# Patient Record
Sex: FEMALE | Race: WHITE | NOT HISPANIC OR LATINO | URBAN - METROPOLITAN AREA
[De-identification: names, ages, dates, MRNs, and addresses within clinical notes are randomized per-mention and may not be internally consistent; named-entity substitution may affect disease eponyms.]

---

## 2019-02-11 ENCOUNTER — INPATIENT (INPATIENT)
Facility: HOSPITAL | Age: 74
LOS: 4 days | Discharge: HOME HEALTH | DRG: 481 | End: 2019-02-16
Attending: ORTHOPAEDIC SURGERY | Admitting: ORTHOPAEDIC SURGERY
Payer: MEDICARE

## 2019-02-11 VITALS
HEART RATE: 72 BPM | DIASTOLIC BLOOD PRESSURE: 65 MMHG | OXYGEN SATURATION: 99 % | TEMPERATURE: 98 F | SYSTOLIC BLOOD PRESSURE: 133 MMHG | RESPIRATION RATE: 16 BRPM

## 2019-02-11 DIAGNOSIS — Z98.89 OTHER SPECIFIED POSTPROCEDURAL STATES: Chronic | ICD-10-CM

## 2019-02-11 LAB
ALBUMIN SERPL ELPH-MCNC: 4 G/DL — SIGNIFICANT CHANGE UP (ref 3.3–5)
ALP SERPL-CCNC: 75 U/L — SIGNIFICANT CHANGE UP (ref 40–120)
ALT FLD-CCNC: SIGNIFICANT CHANGE UP U/L (ref 10–45)
ANION GAP SERPL CALC-SCNC: 11 MMOL/L — SIGNIFICANT CHANGE UP (ref 5–17)
AST SERPL-CCNC: SIGNIFICANT CHANGE UP U/L (ref 10–40)
BILIRUB SERPL-MCNC: 0.7 MG/DL — SIGNIFICANT CHANGE UP (ref 0.2–1.2)
BUN SERPL-MCNC: 17 MG/DL — SIGNIFICANT CHANGE UP (ref 7–23)
CALCIUM SERPL-MCNC: 9.1 MG/DL — SIGNIFICANT CHANGE UP (ref 8.4–10.5)
CHLORIDE SERPL-SCNC: 108 MMOL/L — SIGNIFICANT CHANGE UP (ref 96–108)
CO2 SERPL-SCNC: 22 MMOL/L — SIGNIFICANT CHANGE UP (ref 22–31)
CREAT SERPL-MCNC: 1.09 MG/DL — SIGNIFICANT CHANGE UP (ref 0.5–1.3)
GLUCOSE SERPL-MCNC: 122 MG/DL — HIGH (ref 70–99)
HCT VFR BLD CALC: 36.2 % — SIGNIFICANT CHANGE UP (ref 34.5–45)
HGB BLD-MCNC: 11.5 G/DL — SIGNIFICANT CHANGE UP (ref 11.5–15.5)
MCHC RBC-ENTMCNC: 31.4 PG — SIGNIFICANT CHANGE UP (ref 27–34)
MCHC RBC-ENTMCNC: 31.8 GM/DL — LOW (ref 32–36)
MCV RBC AUTO: 98.9 FL — SIGNIFICANT CHANGE UP (ref 80–100)
NRBC # BLD: 0 /100 WBCS — SIGNIFICANT CHANGE UP (ref 0–0)
PLATELET # BLD AUTO: 143 K/UL — LOW (ref 150–400)
POTASSIUM SERPL-MCNC: SIGNIFICANT CHANGE UP MMOL/L (ref 3.5–5.3)
POTASSIUM SERPL-SCNC: SIGNIFICANT CHANGE UP MMOL/L (ref 3.5–5.3)
PROT SERPL-MCNC: 7.6 G/DL — SIGNIFICANT CHANGE UP (ref 6–8.3)
RBC # BLD: 3.66 M/UL — LOW (ref 3.8–5.2)
RBC # FLD: 12.3 % — SIGNIFICANT CHANGE UP (ref 10.3–14.5)
SODIUM SERPL-SCNC: 141 MMOL/L — SIGNIFICANT CHANGE UP (ref 135–145)
WBC # BLD: 10.79 K/UL — HIGH (ref 3.8–10.5)
WBC # FLD AUTO: 10.79 K/UL — HIGH (ref 3.8–10.5)

## 2019-02-11 PROCEDURE — 73552 X-RAY EXAM OF FEMUR 2/>: CPT | Mod: 26,RT

## 2019-02-11 PROCEDURE — 71045 X-RAY EXAM CHEST 1 VIEW: CPT | Mod: 26

## 2019-02-11 PROCEDURE — 73502 X-RAY EXAM HIP UNI 2-3 VIEWS: CPT | Mod: 26,RT

## 2019-02-11 PROCEDURE — 99285 EMERGENCY DEPT VISIT HI MDM: CPT

## 2019-02-11 RX ORDER — DONEPEZIL HYDROCHLORIDE 10 MG/1
10 TABLET, FILM COATED ORAL AT BEDTIME
Qty: 0 | Refills: 0 | Status: DISCONTINUED | OUTPATIENT
Start: 2019-02-11 | End: 2019-02-16

## 2019-02-11 RX ORDER — DOCUSATE SODIUM 100 MG
100 CAPSULE ORAL THREE TIMES A DAY
Qty: 0 | Refills: 0 | Status: DISCONTINUED | OUTPATIENT
Start: 2019-02-11 | End: 2019-02-13

## 2019-02-11 RX ORDER — ATORVASTATIN CALCIUM 80 MG/1
10 TABLET, FILM COATED ORAL AT BEDTIME
Qty: 0 | Refills: 0 | Status: DISCONTINUED | OUTPATIENT
Start: 2019-02-11 | End: 2019-02-16

## 2019-02-11 RX ORDER — QUETIAPINE FUMARATE 200 MG/1
25 TABLET, FILM COATED ORAL AT BEDTIME
Qty: 0 | Refills: 0 | Status: DISCONTINUED | OUTPATIENT
Start: 2019-02-11 | End: 2019-02-16

## 2019-02-11 RX ORDER — MEMANTINE HYDROCHLORIDE 10 MG/1
10 TABLET ORAL ONCE
Qty: 0 | Refills: 0 | Status: COMPLETED | OUTPATIENT
Start: 2019-02-11 | End: 2019-02-11

## 2019-02-11 RX ORDER — HALOPERIDOL DECANOATE 100 MG/ML
2 INJECTION INTRAMUSCULAR EVERY 8 HOURS
Qty: 0 | Refills: 0 | Status: DISCONTINUED | OUTPATIENT
Start: 2019-02-11 | End: 2019-02-16

## 2019-02-11 RX ORDER — MORPHINE SULFATE 50 MG/1
2 CAPSULE, EXTENDED RELEASE ORAL ONCE
Qty: 0 | Refills: 0 | Status: DISCONTINUED | OUTPATIENT
Start: 2019-02-11 | End: 2019-02-11

## 2019-02-11 RX ORDER — HALOPERIDOL DECANOATE 100 MG/ML
2.5 INJECTION INTRAMUSCULAR ONCE
Qty: 0 | Refills: 0 | Status: COMPLETED | OUTPATIENT
Start: 2019-02-11 | End: 2019-02-11

## 2019-02-11 RX ORDER — ATENOLOL 25 MG/1
25 TABLET ORAL DAILY
Qty: 0 | Refills: 0 | Status: DISCONTINUED | OUTPATIENT
Start: 2019-02-11 | End: 2019-02-16

## 2019-02-11 RX ORDER — LEVETIRACETAM 250 MG/1
500 TABLET, FILM COATED ORAL
Qty: 0 | Refills: 0 | Status: DISCONTINUED | OUTPATIENT
Start: 2019-02-11 | End: 2019-02-16

## 2019-02-11 RX ORDER — FOLIC ACID 0.8 MG
1 TABLET ORAL DAILY
Qty: 0 | Refills: 0 | Status: DISCONTINUED | OUTPATIENT
Start: 2019-02-11 | End: 2019-02-16

## 2019-02-11 RX ORDER — ACETAMINOPHEN 500 MG
650 TABLET ORAL EVERY 6 HOURS
Qty: 0 | Refills: 0 | Status: DISCONTINUED | OUTPATIENT
Start: 2019-02-11 | End: 2019-02-13

## 2019-02-11 RX ADMIN — MEMANTINE HYDROCHLORIDE 10 MILLIGRAM(S): 10 TABLET ORAL at 21:53

## 2019-02-11 RX ADMIN — MORPHINE SULFATE 2 MILLIGRAM(S): 50 CAPSULE, EXTENDED RELEASE ORAL at 16:30

## 2019-02-11 RX ADMIN — LEVETIRACETAM 500 MILLIGRAM(S): 250 TABLET, FILM COATED ORAL at 21:54

## 2019-02-11 RX ADMIN — ATORVASTATIN CALCIUM 10 MILLIGRAM(S): 80 TABLET, FILM COATED ORAL at 21:54

## 2019-02-11 RX ADMIN — QUETIAPINE FUMARATE 25 MILLIGRAM(S): 200 TABLET, FILM COATED ORAL at 21:53

## 2019-02-11 RX ADMIN — HALOPERIDOL DECANOATE 2.5 MILLIGRAM(S): 100 INJECTION INTRAMUSCULAR at 20:58

## 2019-02-11 RX ADMIN — DONEPEZIL HYDROCHLORIDE 10 MILLIGRAM(S): 10 TABLET, FILM COATED ORAL at 21:53

## 2019-02-11 RX ADMIN — MORPHINE SULFATE 2 MILLIGRAM(S): 50 CAPSULE, EXTENDED RELEASE ORAL at 20:42

## 2019-02-11 RX ADMIN — Medication 100 MILLIGRAM(S): at 21:54

## 2019-02-11 NOTE — ED ADULT NURSE NOTE - NS ED PATIENT SAFETY CONCERN
----- Message from Veronica Quintanilla MD sent at 11/26/2017 10:08 PM EST -----  Let him know  ----- Message -----  From: Marybeth Briones  Sent: 11/22/2017   8:58 AM  To: Veronica Quintanilla MD    PA denied for [employer].
No

## 2019-02-11 NOTE — ED PROVIDER NOTE - MEDICAL DECISION MAKING DETAILS
here w/ hip fracture. guardian updated. ortho consulted and at bedside. anticipate admission for operative repair as this would improve her quality of life with this injury.

## 2019-02-11 NOTE — ED ADULT NURSE NOTE - OBJECTIVE STATEMENT
Pt from home with dementia , has caregivers day and night as per aid found patient in bed screamig in pain , right hip visible deformity pt only yells in pain on movement pt is confused awaiting x-ray iv started and labs sent agency [facility] has no medical information on pt .

## 2019-02-11 NOTE — ED ADULT TRIAGE NOTE - OTHER COMPLAINTS
Hx of dementia, alert to person. per HHA "shes tender to the right", unknown if sustained fall. soft splint applied by EMS

## 2019-02-11 NOTE — H&P ADULT - NSHPPHYSICALEXAM_GEN_ALL_CORE
Vital Signs Last 24 Hrs  T(C): 36.7 (11 Feb 2019 15:32), Max: 36.9 (11 Feb 2019 13:50)  T(F): 98 (11 Feb 2019 15:32), Max: 98.4 (11 Feb 2019 13:50)  HR: 65 (11 Feb 2019 19:52) (65 - 72)  BP: 153/96 (11 Feb 2019 19:52) (133/65 - 153/96)  BP(mean): --  RR: 17 (11 Feb 2019 19:52) (16 - 17)  SpO2: 98% (11 Feb 2019 19:52) (96% - 99%)    General: Thin elderly female lying supine; Patient is disoriented to the encounter; A&Ox0; Home health aide at bedside states this is consistent with baseline  RLE: Extremity shortened and externally rotated; No skin breaks over the extremity; Significant TTP over the groin area; Extremity WWP, DP/PT pulses 2+cap refill <2 sec; Pt unable to respond appropriately to sensory exam; Moving toes spontaneously  LLE/RUE/LUE/Back: No visible signs of trauma on head to toe exam; Pt unable to consistently participate with exam but reports no tenderness; No appreciable deformities

## 2019-02-11 NOTE — H&P ADULT - ASSESSMENT
A/P: 73F w severe dementia presents w R IT fx following unknown trauma. No apparent other injuries.    - Admit to Orthopaedics  - Added on to OR tomorrow for R IM nail w Dr. Saldana  - NPO/IVF at midnight  - Pain control  - Hold anticoagulation for OR  - CBC/CMP/Coags/UA/T&S  - CXR   - EKG  - Medical Clearance by Dr. Herrera  - Discussed with attending-- Dr. Saldana    Ortho pager [phone]

## 2019-02-11 NOTE — ED ADULT NURSE NOTE - NSIMPLEMENTINTERV_GEN_ALL_ED
Implemented All Universal Safety Interventions:  [place] to call system. Call bell, personal items and telephone within reach. Instruct patient to call for assistance. Room bathroom lighting operational. Non-slip footwear when patient is off stretcher. Physically safe environment: no spills, clutter or unnecessary equipment. Stretcher in lowest position, wheels locked, appropriate side rails in place.

## 2019-02-11 NOTE — H&P ADULT - NSHPSOCIALHISTORY_GEN_ALL_CORE
Patient with severe alzheimer's dementia  Requires 24hr care with HHA  Pt has no living family members involved in her life-- Legal guardianship by outside agency (daytime phone: [phone]; evening/weekend phone: [phone])  Pt has healthcare proxy Eveline Yadav (unclear relationship; phones: [phone]; [phone])

## 2019-02-11 NOTE — ED ADULT NURSE NOTE - CAS EDN DISCHARGE ASSESSMENT
Awake/Patient baseline mental status Alert and oriented to person, place and time/Awake/Patient baseline mental status

## 2019-02-11 NOTE — ED PROVIDER NOTE - PHYSICAL EXAMINATION
CONSTITUTIONAL: Well-appearing; well-nourished; in no apparent distress.   HEAD: Normocephalic; atraumatic.   EYES:  conjunctiva and sclera clear  ENT: normal nose; no rhinorrhea; normal pharynx with no erythema or lesions.   NECK: Supple; non-tender;   CARDIOVASCULAR: Normal S1, S2; no murmurs, rubs, or gallops. Regular rate and rhythm.   RESPIRATORY: Breathing easily; breath sounds clear and equal bilaterally; no wheezes, rhonchi, or rales.  GI: Soft; non-distended; non-tender; no palpable organomegaly.   EXT: No cyanosis or edema; N/V intact. RLE shortened, tender over hip, unable to range. distal pulses intact, sensation intact distally, able to wiggle toes  SKIN: Normal for age and race; warm; dry; good turgor; no apparent lesions or rash.   NEURO: A & Ox1, awake and alert; face symmetric; grossly unremarkable.   PSYCHOLOGICAL: The patient’s mood and manner are appropriate.

## 2019-02-11 NOTE — ED ADULT NURSE REASSESSMENT NOTE - NS ED NURSE REASSESS COMMENT FT1
Called Ortho to request joshi for patient spoke to Dr. Goodwin stated not necessary, patient just incontinent of urine, so unable to straight cath at the moment, as per Dr. Goodwin patient for surgery tomorrow night, she can be bladder scanned for residual and straight cath on floor if necessary, he will communicate this to floor.

## 2019-02-11 NOTE — H&P ADULT - HISTORY OF PRESENT ILLNESS
HPI: Pt is a 73F w PMHx severe dementia (otherwise unknown PMHx) brought in by A after pt complained of R hip pain this AM. No known trauma history. No other history of injury able to be provided. Pt unable to answer questions.    PAST MEDICAL & SURGICAL HISTORY:  Intracranial bleed  Dementia, with behavioral disturbance  Essential hypertension  History of back surgery: 20 years ago  S/P craniotomy: Right      Medications:  Home Medications:  Aricept 10 mg oral tablet: 1 tab(s) orally once a day (at bedtime) (25 Mar 2016 14:10)  atenolol 25 mg oral tablet: 1 tab(s) orally once a day (25 Mar 2016 14:10)  Colace 100 mg oral capsule: 1 cap(s) orally 3 times a day (25 Mar 2016 14:10)  folic acid 1 mg oral tablet: 1 tab(s) orally once a day (25 Mar 2016 14:10)  haloperidol: 2.5 milligram(s) orally every 8 hours, As Needed for agitation (25 Mar 2016 14:10)  Keppra 500 mg oral tablet: 1 tab(s) orally 2 times a day (18 Mar 2016 13:29)  Lipitor 10 mg oral tablet: 1 tab(s) orally once a day (at bedtime) (18 Mar 2016 13:49)  Namenda 10 mg oral tablet: 1 tab(s) orally once a day (06 Jun 2016 13:42)  Tylenol 8 Hour Caplet 650 mg oral tablet, extended release: 1 tab(s) orally 3 times a day, As Needed (28 Oct 2015 21:49)      Allergies    No Known Allergies    Intolerances

## 2019-02-11 NOTE — ED ADULT TRIAGE NOTE - AS TEMP SITE
PT DAILY TREATMENT NOTE - [facility]     Patient Name: Diego Cruz  Date:2018  : 1948  [x]  Patient  Verified  Payor: Raheem Hutson / Plan: [facility] [employer] MEDICARE CHOICE PPO/PFFS / Product Type: Managed Care Medicare /    In time:4:30Out time:5:15  Total Treatment Time (min): 45  Total Timed Codes (min): 45  1:1 Treatment Time ( [address] only): 39  Visit #: 2 of 10    Treatment Area: Pain in left foot [M79.672]  Plantar fascial fibromatosis [M72.2]    SUBJECTIVE  Pain Level (0-10 scale): 0/10  Any medication changes, allergies to medications, adverse drug reactions, diagnosis change, or new procedure performed?: [x] No    [] Yes (see summary sheet for update)  Subjective functional status/changes:   [] No changes reported  I want to walk without pain. OBJECTIVE      35 min Therapeutic Exercise:  [x] See flow sheet :   Rationale: increase ROM, increase strength and improve coordination to improve the patients ability to return to exercise program at gym. 10 min PROM, DTM, joint mobs to increase function of L foot    Rationale: decrease pain, increase ROM, increase tissue extensibility, decrease edema , correct positional vertigo and decrease trigger points to return to working out at the gym. With   [] TE   [] TA   [] neuro   [] other: Patient Education: [x] Review HEP    [] Progressed/Changed HEP based on:   [] positioning   [] body mechanics   [] transfers   [] heat/ice application    [] other:      Other Objective/Functional Measures: Ptwith tightness noted of the L gastroc and stretching of the muscle and tendon eleviated some soreness according to patient post treatment communication. Pain Level (0-10 scale) post treatment: 0/10    ASSESSMENT/Changes in Function: Progressing toward goals and should benefit from continued sessions.     Patient will continue to benefit from skilled PT services to address ROM deficits, address strength deficits, analyze and address soft tissue oral restrictions and analyze and cue movement patterns to attain remaining goals. [x]  See Plan of Care  []  See progress note/recertification  []  See Discharge Summary         Progress towards goals / Updated goals:  Short Term Goals: To be accomplished in 1 weeks:                        1. Pt will be independent with HEP to facilitate decreased frequency of symptoms. Long Term Goals: To be accomplished in 4 weeks:                        1. Pt will improve FOTO score by 9 points to increase ease of ADL's.                         2. Pt will increase left dorsiflexion AROM to 12 degrees bilaterally to facilitate greater dorsiflexion during ambulation. 3. Pt will increase left dorsiflexion MMT to 5/5 to facilitate longer step length during ambulation.                          4. Pt will increase left SLS balance to 30 seconds to decrease risk of falls.        PLAN  [x]  Upgrade activities as tolerated     [x]  Continue plan of care  []  Update interventions per flow sheet       []  Discharge due to:_  []  Other:_      Joan Novak 5/30/2018  3:30 PM    Future Appointments  Date Time Provider Ann Marie Lovell   5/30/2018 4:30 PM [facility] PT [address] [facility]   6/12/2018 7:30 AM Holly Davis MD [facility] [address]

## 2019-02-11 NOTE — ED PROVIDER NOTE - OBJECTIVE STATEMENT
72yo F hx of HTN, subdural s/p craniotomy, alzheimer's dementia, under guardianship, here w/ complaint of R hip pain. Started complaining of this yesterday. Aide with her now was not the one who called, she is not sure if/when pt fell or any prior hx. Pt does not know 2/2 her own dementia. No signs of trauma elsewhere.

## 2019-02-11 NOTE — ED PROVIDER NOTE - PROGRESS NOTE DETAILS
pts guardian is [employer]. [phone] (work hours), [phone] off hours. [facility] p[facility] pcp is [facility], [phone], NP Hank Guzman, cell [facility], [phone], NP Hank Guzman, cell [phone]

## 2019-02-12 DIAGNOSIS — I10 ESSENTIAL (PRIMARY) HYPERTENSION: ICD-10-CM

## 2019-02-12 DIAGNOSIS — I62.9 NONTRAUMATIC INTRACRANIAL HEMORRHAGE, UNSPECIFIED: ICD-10-CM

## 2019-02-12 DIAGNOSIS — Z01.818 ENCOUNTER FOR OTHER PREPROCEDURAL EXAMINATION: ICD-10-CM

## 2019-02-12 DIAGNOSIS — G30.1 ALZHEIMER'S DISEASE WITH LATE ONSET: ICD-10-CM

## 2019-02-12 DIAGNOSIS — S72.141A DISPLACED INTERTROCHANTERIC FRACTURE OF RIGHT FEMUR, INITIAL ENCOUNTER FOR CLOSED FRACTURE: ICD-10-CM

## 2019-02-12 LAB
ANION GAP SERPL CALC-SCNC: 13 MMOL/L — SIGNIFICANT CHANGE UP (ref 5–17)
APPEARANCE UR: CLEAR — SIGNIFICANT CHANGE UP
BACTERIA # UR AUTO: ABNORMAL /HPF
BASOPHILS # BLD AUTO: 0.03 K/UL — SIGNIFICANT CHANGE UP (ref 0–0.2)
BASOPHILS NFR BLD AUTO: 0.3 % — SIGNIFICANT CHANGE UP (ref 0–2)
BILIRUB UR-MCNC: NEGATIVE — SIGNIFICANT CHANGE UP
BUN SERPL-MCNC: 17 MG/DL — SIGNIFICANT CHANGE UP (ref 7–23)
CALCIUM SERPL-MCNC: 9.2 MG/DL — SIGNIFICANT CHANGE UP (ref 8.4–10.5)
CHLORIDE SERPL-SCNC: 105 MMOL/L — SIGNIFICANT CHANGE UP (ref 96–108)
CO2 SERPL-SCNC: 24 MMOL/L — SIGNIFICANT CHANGE UP (ref 22–31)
COLOR SPEC: YELLOW — SIGNIFICANT CHANGE UP
CREAT SERPL-MCNC: 0.95 MG/DL — SIGNIFICANT CHANGE UP (ref 0.5–1.3)
DIFF PNL FLD: ABNORMAL
EOSINOPHIL # BLD AUTO: 0.03 K/UL — SIGNIFICANT CHANGE UP (ref 0–0.5)
EOSINOPHIL NFR BLD AUTO: 0.3 % — SIGNIFICANT CHANGE UP (ref 0–6)
EPI CELLS # UR: SIGNIFICANT CHANGE UP /HPF (ref 0–5)
GLUCOSE SERPL-MCNC: 100 MG/DL — HIGH (ref 70–99)
GLUCOSE UR QL: NEGATIVE — SIGNIFICANT CHANGE UP
HCT VFR BLD CALC: 34.6 % — SIGNIFICANT CHANGE UP (ref 34.5–45)
HGB BLD-MCNC: 11 G/DL — LOW (ref 11.5–15.5)
IMM GRANULOCYTES NFR BLD AUTO: 0.4 % — SIGNIFICANT CHANGE UP (ref 0–1.5)
KETONES UR-MCNC: NEGATIVE — SIGNIFICANT CHANGE UP
LEUKOCYTE ESTERASE UR-ACNC: ABNORMAL
LYMPHOCYTES # BLD AUTO: 2.12 K/UL — SIGNIFICANT CHANGE UP (ref 1–3.3)
LYMPHOCYTES # BLD AUTO: 20.3 % — SIGNIFICANT CHANGE UP (ref 13–44)
MCHC RBC-ENTMCNC: 31.2 PG — SIGNIFICANT CHANGE UP (ref 27–34)
MCHC RBC-ENTMCNC: 31.8 GM/DL — LOW (ref 32–36)
MCV RBC AUTO: 98 FL — SIGNIFICANT CHANGE UP (ref 80–100)
MONOCYTES # BLD AUTO: 1.17 K/UL — HIGH (ref 0–0.9)
MONOCYTES NFR BLD AUTO: 11.2 % — SIGNIFICANT CHANGE UP (ref 2–14)
MRSA PCR RESULT.: NEGATIVE — SIGNIFICANT CHANGE UP
NEUTROPHILS # BLD AUTO: 7.04 K/UL — SIGNIFICANT CHANGE UP (ref 1.8–7.4)
NEUTROPHILS NFR BLD AUTO: 67.5 % — SIGNIFICANT CHANGE UP (ref 43–77)
NITRITE UR-MCNC: POSITIVE
NRBC # BLD: 0 /100 WBCS — SIGNIFICANT CHANGE UP (ref 0–0)
PH UR: 5 — SIGNIFICANT CHANGE UP (ref 5–8)
PLATELET # BLD AUTO: 150 K/UL — SIGNIFICANT CHANGE UP (ref 150–400)
POTASSIUM SERPL-MCNC: 3.8 MMOL/L — SIGNIFICANT CHANGE UP (ref 3.5–5.3)
POTASSIUM SERPL-SCNC: 3.8 MMOL/L — SIGNIFICANT CHANGE UP (ref 3.5–5.3)
PROT UR-MCNC: ABNORMAL MG/DL
RBC # BLD: 3.53 M/UL — LOW (ref 3.8–5.2)
RBC # FLD: 12.5 % — SIGNIFICANT CHANGE UP (ref 10.3–14.5)
RBC CASTS # UR COMP ASSIST: < 5 /HPF — SIGNIFICANT CHANGE UP
S AUREUS DNA NOSE QL NAA+PROBE: NEGATIVE — SIGNIFICANT CHANGE UP
SODIUM SERPL-SCNC: 142 MMOL/L — SIGNIFICANT CHANGE UP (ref 135–145)
SP GR SPEC: >=1.03 — SIGNIFICANT CHANGE UP (ref 1–1.03)
UROBILINOGEN FLD QL: 0.2 E.U./DL — SIGNIFICANT CHANGE UP
WBC # BLD: 10.43 K/UL — SIGNIFICANT CHANGE UP (ref 3.8–10.5)
WBC # FLD AUTO: 10.43 K/UL — SIGNIFICANT CHANGE UP (ref 3.8–10.5)
WBC UR QL: ABNORMAL /HPF

## 2019-02-12 PROCEDURE — 73552 X-RAY EXAM OF FEMUR 2/>: CPT | Mod: 26,RT

## 2019-02-12 PROCEDURE — 99223 1ST HOSP IP/OBS HIGH 75: CPT | Mod: GC

## 2019-02-12 RX ORDER — KETOROLAC TROMETHAMINE 30 MG/ML
15 SYRINGE (ML) INJECTION ONCE
Qty: 0 | Refills: 0 | Status: DISCONTINUED | OUTPATIENT
Start: 2019-02-12 | End: 2019-02-13

## 2019-02-12 RX ORDER — SENNA PLUS 8.6 MG/1
2 TABLET ORAL AT BEDTIME
Qty: 0 | Refills: 0 | Status: DISCONTINUED | OUTPATIENT
Start: 2019-02-13 | End: 2019-02-16

## 2019-02-12 RX ORDER — POLYETHYLENE GLYCOL 3350 17 G/17G
17 POWDER, FOR SOLUTION ORAL DAILY
Qty: 0 | Refills: 0 | Status: DISCONTINUED | OUTPATIENT
Start: 2019-02-13 | End: 2019-02-16

## 2019-02-12 RX ORDER — SODIUM CHLORIDE 9 MG/ML
1000 INJECTION INTRAMUSCULAR; INTRAVENOUS; SUBCUTANEOUS
Qty: 0 | Refills: 0 | Status: DISCONTINUED | OUTPATIENT
Start: 2019-02-12 | End: 2019-02-12

## 2019-02-12 RX ORDER — ACETAMINOPHEN 500 MG
650 TABLET ORAL EVERY 6 HOURS
Qty: 0 | Refills: 0 | Status: DISCONTINUED | OUTPATIENT
Start: 2019-02-13 | End: 2019-02-16

## 2019-02-12 RX ORDER — ONDANSETRON 8 MG/1
4 TABLET, FILM COATED ORAL EVERY 6 HOURS
Qty: 0 | Refills: 0 | Status: DISCONTINUED | OUTPATIENT
Start: 2019-02-12 | End: 2019-02-16

## 2019-02-12 RX ORDER — DOCUSATE SODIUM 100 MG
100 CAPSULE ORAL THREE TIMES A DAY
Qty: 0 | Refills: 0 | Status: DISCONTINUED | OUTPATIENT
Start: 2019-02-13 | End: 2019-02-16

## 2019-02-12 RX ORDER — TRAMADOL HYDROCHLORIDE 50 MG/1
50 TABLET ORAL EVERY 8 HOURS
Qty: 0 | Refills: 0 | Status: DISCONTINUED | OUTPATIENT
Start: 2019-02-12 | End: 2019-02-16

## 2019-02-12 RX ORDER — SODIUM CHLORIDE 9 MG/ML
1000 INJECTION INTRAMUSCULAR; INTRAVENOUS; SUBCUTANEOUS
Qty: 0 | Refills: 0 | Status: DISCONTINUED | OUTPATIENT
Start: 2019-02-12 | End: 2019-02-16

## 2019-02-12 RX ORDER — ACETAMINOPHEN 500 MG
1000 TABLET ORAL ONCE
Qty: 0 | Refills: 0 | Status: COMPLETED | OUTPATIENT
Start: 2019-02-12 | End: 2019-02-12

## 2019-02-12 RX ORDER — HEPARIN SODIUM 5000 [USP'U]/ML
5000 INJECTION INTRAVENOUS; SUBCUTANEOUS EVERY 8 HOURS
Qty: 0 | Refills: 0 | Status: DISCONTINUED | OUTPATIENT
Start: 2019-02-12 | End: 2019-02-12

## 2019-02-12 RX ORDER — ASPIRIN/CALCIUM CARB/MAGNESIUM 324 MG
325 TABLET ORAL
Qty: 0 | Refills: 0 | Status: DISCONTINUED | OUTPATIENT
Start: 2019-02-12 | End: 2019-02-16

## 2019-02-12 RX ORDER — CIPROFLOXACIN LACTATE 400MG/40ML
250 VIAL (ML) INTRAVENOUS
Qty: 0 | Refills: 0 | Status: DISCONTINUED | OUTPATIENT
Start: 2019-02-12 | End: 2019-02-15

## 2019-02-12 RX ORDER — ASPIRIN/CALCIUM CARB/MAGNESIUM 324 MG
325 TABLET ORAL
Qty: 0 | Refills: 0 | Status: DISCONTINUED | OUTPATIENT
Start: 2019-02-12 | End: 2019-02-12

## 2019-02-12 RX ORDER — TRAMADOL HYDROCHLORIDE 50 MG/1
25 TABLET ORAL EVERY 6 HOURS
Qty: 0 | Refills: 0 | Status: DISCONTINUED | OUTPATIENT
Start: 2019-02-12 | End: 2019-02-16

## 2019-02-12 RX ADMIN — Medication 100 MILLIGRAM(S): at 23:06

## 2019-02-12 RX ADMIN — ATENOLOL 25 MILLIGRAM(S): 25 TABLET ORAL at 06:33

## 2019-02-12 RX ADMIN — SODIUM CHLORIDE 75 MILLILITER(S): 9 INJECTION INTRAMUSCULAR; INTRAVENOUS; SUBCUTANEOUS at 03:14

## 2019-02-12 RX ADMIN — SODIUM CHLORIDE 75 MILLILITER(S): 9 INJECTION INTRAMUSCULAR; INTRAVENOUS; SUBCUTANEOUS at 20:53

## 2019-02-12 RX ADMIN — ATORVASTATIN CALCIUM 10 MILLIGRAM(S): 80 TABLET, FILM COATED ORAL at 23:06

## 2019-02-12 RX ADMIN — LEVETIRACETAM 500 MILLIGRAM(S): 250 TABLET, FILM COATED ORAL at 06:33

## 2019-02-12 RX ADMIN — DONEPEZIL HYDROCHLORIDE 10 MILLIGRAM(S): 10 TABLET, FILM COATED ORAL at 23:06

## 2019-02-12 RX ADMIN — Medication 1000 MILLIGRAM(S): at 23:55

## 2019-02-12 RX ADMIN — QUETIAPINE FUMARATE 25 MILLIGRAM(S): 200 TABLET, FILM COATED ORAL at 23:06

## 2019-02-12 RX ADMIN — Medication 100 MILLIGRAM(S): at 06:33

## 2019-02-12 RX ADMIN — Medication 400 MILLIGRAM(S): at 23:07

## 2019-02-12 NOTE — PROVIDER CONTACT NOTE (OTHER) - RECOMMENDATIONS
Suggested to have "constant observation" order discontinued. Will place px on bed alarm. Observe behavior upon arrival and institute enhanced precaution and obtain order for such if needed later

## 2019-02-12 NOTE — PROGRESS NOTE ADULT - SUBJECTIVE AND OBJECTIVE BOX
Ortho Note    Pt disoriented and unable to coherently participate in exam or interview.      Vital Signs Last 24 Hrs  T(C): 35.6 (02-12-19 @ 09:57), Max: 35.6 (02-12-19 @ 09:57)  T(F): 96 (02-12-19 @ 09:57), Max: 96 (02-12-19 @ 09:57)  HR: 67 (02-12-19 @ 09:57) (67 - 67)  BP: 126/59 (02-12-19 @ 09:57) (126/59 - 126/59)  BP(mean): --  RR: 18 (02-12-19 @ 09:57) (18 - 18)  SpO2: 99% (02-12-19 @ 09:57) (99% - 99%)      General: Pt A&Ox0, NAD  RLE shortened externally rotated   no tenting or breaks in skin of RLE  DP's palpable b/l  brisk cap refill b/l  pt moving toes b/l spontaneously                          11.0   10.43 )-----------( 150      ( 12 Feb 2019 06:57 )             34.6   12 Feb 2019 06:57    142    |  105    |  17     ----------------------------<  100    3.8     |  24     |  0.95       TPro  7.6    /  Alb  4.0    /  TBili  0.7    /  DBili  x      /  AST  See Note  /  ALT  See Note  /  AlkPhos  75     11 Feb 2019 14:42    UA:  +Nitrites, Leuks, and WBC's    A/P: 73yFemale s/p R intertrochanteric fracture   - Stable  - Pain Control as needed  - NPO/IVF  - Hold anticoagulation for OR   - WBS: NWB RLE  - Javier Following, medically optimized and cleared pt for OR  - Consult Palliative care  - Start empiric UTI treatment with ciprofloxacin  - Dispo: OR today     Ortho Pager [phone]

## 2019-02-12 NOTE — CONSULT NOTE ADULT - PROBLEM SELECTOR RECOMMENDATION 5
The patient's medical condition is optimized for surgery.  There is no contraindication for surgery.  There is no clinical evidence neither of angina, decompensated CHF, arrhthymias, nor valvular disease.   There is no limitation of exercise capacity.  MET is 5 .  ASA class is3 .  [place] cardiac risk factor is high .  DVT prophylaxis is indicated.  Pain control.  Early mobilization.  Avoid fluid overload.

## 2019-02-12 NOTE — CONSULT NOTE ADULT - PROBLEM SELECTOR RECOMMENDATION 4
high risk for delirium.  Avoid benzo and narcotics, Also mental status might deteriorate from fat emboli.  Risk for fall and aspiration.  Needs dysphagia evaluation

## 2019-02-12 NOTE — PROVIDER CONTACT NOTE (OTHER) - SITUATION
Telephone report received re px. Reportedly with order for constant observation in the ER holding area but without any attempt to get OOB nor any agitation.

## 2019-02-12 NOTE — PROGRESS NOTE ADULT - ASSESSMENT
A/P: 73F w severe dementia presents w R IT fx following unknown trauma. No apparent other injuries.    - Added on to OR for R IM nail w Dr. Saldana pending consent and medical clearance  - NPO/IVF  - Pain control  - Hold anticoagulation for OR  - Medical Clearance by Dr. Herrera  - DVT ppx: SCDs  - PT, WB: CHRIS RLE  - Dispo pending OR today vs tomorrow    Ortho pager [phone]

## 2019-02-12 NOTE — BRIEF OPERATIVE NOTE - PROCEDURE
<<-----Click on this checkbox to enter Procedure Antegrade intramedullary nailing for fracture of femur  02/12/2019    Active  [id number]

## 2019-02-12 NOTE — CONSULT NOTE ADULT - SUBJECTIVE AND OBJECTIVE BOX
Patient is a 73y old  Female who presents with a chief complaint of R Intertrochanteric fracture (12 Feb 2019 05:55)      HPI:  HPI: Pt is a 73F w PMHx severe dementia (otherwise unknown PMHx) brought in by [facility] after pt complained of R hip pain this AM. No known trauma history. No other history of injury able to be provided. Pt unable to answer questions.    PAST MEDICAL & SURGICAL HISTORY:  Intracranial bleed  Dementia, with behavioral disturbance  Essential hypertension  History of back surgery: 20 years ago  S/P craniotomy: Right      Medications:  Home Medications:  Aricept 10 mg oral tablet: 1 tab(s) orally once a day (at bedtime) (25 Mar 2016 14:10)  atenolol 25 mg oral tablet: 1 tab(s) orally once a day (25 Mar 2016 14:10)  Colace 100 mg oral capsule: 1 cap(s) orally 3 times a day (25 Mar 2016 14:10)  folic acid 1 mg oral tablet: 1 tab(s) orally once a day (25 Mar 2016 14:10)  haloperidol: 2.5 milligram(s) orally every 8 hours, As Needed for agitation (25 Mar 2016 14:10)  Keppra 500 mg oral tablet: 1 tab(s) orally 2 times a day (18 Mar 2016 13:29)  Lipitor 10 mg oral tablet: 1 tab(s) orally once a day (at bedtime) (18 Mar 2016 13:49)  Namenda 10 mg oral tablet: 1 tab(s) orally once a day (06 Jun 2016 13:42)  Tylenol 8 Hour Caplet 650 mg oral tablet, extended release: 1 tab(s) orally 3 times a day, As Needed (28 Oct 2015 21:49)      Allergies    No Known Allergies    Intolerances (11 Feb 2019 19:56)      PAST MEDICAL & SURGICAL HISTORY:  Intracranial bleed  Dementia, with behavioral disturbance  Essential hypertension  History of back surgery: 20 years ago  S/P craniotomy: Right      FAMILY HISTORY:  No pertinent family history in first degree relatives      SOCIAL HISTORY:  Smoking Status: [ ] Current, [ ] Former, x[ ] Never  Pack Years:    MEDICATIONS:  Pulmonary:    Antimicrobials:    Anticoagulants:    Onc:    GI/:  docusate sodium 100 milliGRAM(s) Oral three times a day    Endocrine:  atorvastatin 10 milliGRAM(s) Oral at bedtime    Cardiac:  ATENolol  Tablet 25 milliGRAM(s) Oral daily    Other Medications:  acetaminophen   Tablet .. 650 milliGRAM(s) Oral every 6 hours PRN  donepezil 10 milliGRAM(s) Oral at bedtime  folic acid 1 milliGRAM(s) Oral daily  haloperidol     Tablet 2 milliGRAM(s) Oral every 8 hours PRN  levETIRAcetam 500 milliGRAM(s) Oral two times a day  QUEtiapine 25 milliGRAM(s) Oral at bedtime  sodium chloride 0.9%. 1000 milliLiter(s) IV Continuous <Continuous>      Allergies    No Known Allergies    Intolerances        Vital Signs Last 24 Hrs  T(C): 37 (12 Feb 2019 04:41), Max: 37 (12 Feb 2019 04:41)  T(F): 98.6 (12 Feb 2019 04:41), Max: 98.6 (12 Feb 2019 04:41)  HR: 89 (12 Feb 2019 04:41) (65 - 89)  BP: 130/63 (12 Feb 2019 04:41) (130/63 - 153/96)  BP(mean): --  RR: 17 (12 Feb 2019 04:41) (16 - 18)  SpO2: 95% (12 Feb 2019 04:41) (95% - 99%)    02-11 @ 07:01  -  02-12 @ 07:00  --------------------------------------------------------  IN: 300 mL / OUT: 0 mL / NET: 300 mL          LABS:      CBC Full  -  ( 12 Feb 2019 06:57 )  WBC Count : 10.43 K/uL  Hemoglobin : 11.0 g/dL  Hematocrit : 34.6 %  Platelet Count - Automated : 150 K/uL  Mean Cell Volume : 98.0 fl  Mean Cell Hemoglobin : 31.2 pg  Mean Cell Hemoglobin Concentration : 31.8 gm/dL  Auto Neutrophil # : 7.04 K/uL  Auto Lymphocyte # : 2.12 K/uL  Auto Monocyte # : 1.17 K/uL  Auto Eosinophil # : 0.03 K/uL  Auto Basophil # : 0.03 K/uL  Auto Neutrophil % : 67.5 %  Auto Lymphocyte % : 20.3 %  Auto Monocyte % : 11.2 %  Auto Eosinophil % : 0.3 %  Auto Basophil % : 0.3 %    02-12    142  |  105  |  17  ----------------------------<  100<H>  3.8   |  24  |  0.95    Ca    9.2      12 Feb 2019 06:57    TPro  7.6  /  Alb  4.0  /  TBili  0.7  /  DBili  x   /  AST  See Note  /  ALT  See Note  /  AlkPhos  75  02-11    PT/INR - ( 11 Feb 2019 14:43 )   PT: 13.2 sec;   INR: 1.16          PTT - ( 11 Feb 2019 14:43 )  PTT:29.6 sec    CXR NAP  EKG RSR normal          < from: CT Head No Cont (12.25.16 @ 15:23) >    EXAM:  CT BRAIN                           PROCEDURE DATE:  12/25/2016                 INTERPRETATION:  Axial images were obtained from the skull base to the   cranial vertex without intravenous contrast. Soft tissue and bone   algorithm images wereevaluated.    Clinical information: Dementia. Found wandering.    The current study is compared with prior CT brain dated 6/3/2016.    There is no interval change. There is no evidence of acute intracranial   hemorrhage or obvious site of acute infarction. There is nonspecific   ventriculomegaly with prominence of the sulci and cisterns felt to most   likely reflect cerebral volume loss. There are patchy and confluent areas   of hypodensity in the cerebral white matter, nonspecific but likely   reflecting chronic small vessel ischemic change in a patient of this age.   There is no midline shift or mass effect. There are again evident   postoperative changes related to prior right craniotomy; there is   otherwise a normal appearance to the skull and skull base. The visualized   portions of the orbital contents are unremarkable. There is minor mucosal   thickening within bilateral anterior ethmoidal air cells with the   remaining visualized portions of the paranasal sinuses and mastoids noted   to be clear.    IMPRESSION:    Stable examination without evidence for acute intracranial hemorrhage or   obvious site of cerebral infarction.    < end of copied text >        RADIOLOGY & ADDITIONAL STUDIES (The following images were personally reviewed):

## 2019-02-12 NOTE — CONSULT NOTE ADULT - ATTENDING COMMENTS
Patient seen and examined with house-staff during bedside rounds.  Resident note read, including vitals, physical findings, laboratory data, and radiological reports.   Revisions included below.  Direct personal management at bed side and extensive interpretation of the data.  Plan was outlined and discussed in details with the housestaff.  Decision making of high complexity  Action taken for acute disease activity to reflect the level of care provided:  - medication reconciliation  - review laboratory data  palliative care coonsult  will call family

## 2019-02-12 NOTE — PROGRESS NOTE ADULT - SUBJECTIVE AND OBJECTIVE BOX
Ortho Note    Patient disoriented to the interview  Did not coherently answer questions or participate in exam     Vital Signs Last 24 Hrs  T(C): 37 (12 Feb 2019 04:41), Max: 37 (12 Feb 2019 04:41)  T(F): 98.6 (12 Feb 2019 04:41), Max: 98.6 (12 Feb 2019 04:41)  HR: 89 (12 Feb 2019 04:41) (65 - 89)  BP: 130/63 (12 Feb 2019 04:41) (130/63 - 153/96)  BP(mean): --  RR: 17 (12 Feb 2019 04:41) (16 - 18)  SpO2: 95% (12 Feb 2019 04:41) (95% - 99%)    VSS  General: Thin elderly female lying supine; A&Ox0, consistent w baseline  RLE: Extremity shortened and externally rotated; No skin breaks over the extremity; Significant TTP over the groin area; Extremity WWP, DP/PT pulses 2+cap refill <2 sec; Pt unable to respond appropriately to sensory exam; Moving toes spontaneously                          11.5   10.79 )-----------( 143      ( 11 Feb 2019 14:42 )             36.2   11 Feb 2019 14:42    141    |  108    |  17     ----------------------------<  122    See Note   |  22     |  1.09       TPro  7.6    /  Alb  4.0    /  TBili  0.7    /  DBili  x      /  AST  See Note  /  ALT  See Note  /  AlkPhos  75     11 Feb 2019 14:42

## 2019-02-12 NOTE — PROGRESS NOTE ADULT - SUBJECTIVE AND OBJECTIVE BOX
Ortho Post Op Check    Procedure: R IMN  Surgeon: Les     Pt comfortable without complaints, pain controlled  Denies CP, SOB, N/V, numbness/tingling     Vital Signs Last 24 Hrs  T(C): 37.8 (02-12-19 @ 22:52), Max: 37.8 (02-12-19 @ 22:52)  T(F): 100.1 (02-12-19 @ 22:52), Max: 100.1 (02-12-19 @ 22:52)  HR: 95 (02-12-19 @ 22:52) (66 - 95)  BP: 143/67 (02-12-19 @ 22:52) (96/51 - 143/67)  BP(mean): 88 (02-12-19 @ 22:15) (67 - 92)  RR: 19 (02-12-19 @ 22:52) (11 - 20)  SpO2: 95% (02-12-19 @ 22:15) (92% - 96%)      Demented at baseline. Denies pain  DSG C/D/I, aquacel   Pulses: 2+ PT  Sensation:  SILT s/s/sp/dp/t  Motor: EHL/FHL/TA/GS                           11.0   10.43 )-----------( 150      ( 12 Feb 2019 06:57 )             34.6   12 Feb 2019 06:57    142    |  105    |  17     ----------------------------<  100    3.8     |  24     |  0.95       TPro  7.6    /  Alb  4.0    /  TBili  0.7    /  DBili  x      /  AST  See Note  /  ALT  See Note  /  AlkPhos  75     11 Feb 2019 14:42      Post-op X-Ray: hardware intact     A/P: 73yFemale s/p R IMN   - Stable  - Pain Control  - DVT ppx:  BID / SCDs  - Post op abx: Ancef , Cipro (UTI)   - PT, WBS:  WBAT     Ortho Pager [phone]

## 2019-02-13 DIAGNOSIS — D50.0 IRON DEFICIENCY ANEMIA SECONDARY TO BLOOD LOSS (CHRONIC): ICD-10-CM

## 2019-02-13 DIAGNOSIS — Z98.890 OTHER SPECIFIED POSTPROCEDURAL STATES: ICD-10-CM

## 2019-02-13 DIAGNOSIS — N30.00 ACUTE CYSTITIS WITHOUT HEMATURIA: ICD-10-CM

## 2019-02-13 PROCEDURE — 99232 SBSQ HOSP IP/OBS MODERATE 35: CPT | Mod: GC

## 2019-02-13 RX ORDER — CEFAZOLIN SODIUM 1 G
2000 VIAL (EA) INJECTION EVERY 8 HOURS
Qty: 0 | Refills: 0 | Status: COMPLETED | OUTPATIENT
Start: 2019-02-13 | End: 2019-02-13

## 2019-02-13 RX ORDER — CEFAZOLIN SODIUM 1 G
2000 VIAL (EA) INJECTION EVERY 8 HOURS
Qty: 0 | Refills: 0 | Status: DISCONTINUED | OUTPATIENT
Start: 2019-02-13 | End: 2019-02-13

## 2019-02-13 RX ADMIN — ATENOLOL 25 MILLIGRAM(S): 25 TABLET ORAL at 05:37

## 2019-02-13 RX ADMIN — DONEPEZIL HYDROCHLORIDE 10 MILLIGRAM(S): 10 TABLET, FILM COATED ORAL at 22:20

## 2019-02-13 RX ADMIN — LEVETIRACETAM 500 MILLIGRAM(S): 250 TABLET, FILM COATED ORAL at 18:15

## 2019-02-13 RX ADMIN — ATORVASTATIN CALCIUM 10 MILLIGRAM(S): 80 TABLET, FILM COATED ORAL at 22:19

## 2019-02-13 RX ADMIN — Medication 15 MILLIGRAM(S): at 06:22

## 2019-02-13 RX ADMIN — Medication 250 MILLIGRAM(S): at 05:37

## 2019-02-13 RX ADMIN — Medication 325 MILLIGRAM(S): at 05:37

## 2019-02-13 RX ADMIN — HALOPERIDOL DECANOATE 2 MILLIGRAM(S): 100 INJECTION INTRAMUSCULAR at 00:31

## 2019-02-13 RX ADMIN — Medication 250 MILLIGRAM(S): at 18:16

## 2019-02-13 RX ADMIN — Medication 1 MILLIGRAM(S): at 18:15

## 2019-02-13 RX ADMIN — Medication 325 MILLIGRAM(S): at 18:16

## 2019-02-13 RX ADMIN — Medication 15 MILLIGRAM(S): at 05:37

## 2019-02-13 RX ADMIN — QUETIAPINE FUMARATE 25 MILLIGRAM(S): 200 TABLET, FILM COATED ORAL at 22:20

## 2019-02-13 RX ADMIN — Medication 2000 MILLIGRAM(S): at 14:56

## 2019-02-13 RX ADMIN — LEVETIRACETAM 500 MILLIGRAM(S): 250 TABLET, FILM COATED ORAL at 05:37

## 2019-02-13 RX ADMIN — Medication 2000 MILLIGRAM(S): at 22:13

## 2019-02-13 RX ADMIN — Medication 100 MILLIGRAM(S): at 05:37

## 2019-02-13 NOTE — PROGRESS NOTE ADULT - SUBJECTIVE AND OBJECTIVE BOX
Ortho Progress Note    Pt comfortable without complaints, pain controlled  Denies CP, SOB, N/V, numbness/tingling     Vital Signs Last 24 Hrs  T(C): 35.7 (13 Feb 2019 05:17), Max: 37.8 (12 Feb 2019 22:52)  T(F): 96.2 (13 Feb 2019 05:17), Max: 100.1 (12 Feb 2019 22:52)  HR: 86 (13 Feb 2019 05:17) (66 - 99)  BP: 132/63 (13 Feb 2019 05:17) (96/51 - 143/67)  BP(mean): 88 (12 Feb 2019 22:15) (67 - 92)  RR: 20 (13 Feb 2019 05:17) (11 - 20)  SpO2: 93% (13 Feb 2019 05:17) (92% - 99%)    Demented at baseline. Denies pain  DSG C/D/I, aquacel   Pulses: 2+ PT  Sensation:  SILT s/s/sp/dp/t  Motor: EHL/FHL/TA/GS                           11.0   10.43 )-----------( 150      ( 12 Feb 2019 06:57 )             34.6   12 Feb 2019 06:57    142    |  105    |  17     ----------------------------<  100    3.8     |  24     |  0.95       TPro  7.6    /  Alb  4.0    /  TBili  0.7    /  DBili  x      /  AST  See Note  /  ALT  See Note  /  AlkPhos  75     11 Feb 2019 14:42        A/P: 73yFemale s/p R IMN   - Stable  - Pain Control  - DVT ppx:  BID / SCDs  - Post op abx: Ancef , Cipro (UTI)   - PT, WBS:  WBAT     Ortho Pager [phone]

## 2019-02-13 NOTE — DISCHARGE NOTE ADULT - PATIENT PORTAL LINK FT
You can access the [facility], offered by [facility], by registering with the following website: [email or web address]

## 2019-02-13 NOTE — PHYSICAL THERAPY INITIAL EVALUATION ADULT - CRITERIA FOR SKILLED THERAPEUTIC INTERVENTIONS
rehab potential/anticipated discharge recommendation/impairments found/therapy frequency/predicted duration of therapy intervention/risk reduction/prevention/functional limitations in following categories

## 2019-02-13 NOTE — PHYSICAL THERAPY INITIAL EVALUATION ADULT - ADDITIONAL COMMENTS
Patient unable to answer questions. As per new Home Health Aid (who has started covering primary home health aid today), patient lives alone in apartment. Unknown if patient has stairs and if she uses any Assistive Devices.

## 2019-02-13 NOTE — SWALLOW BEDSIDE ASSESSMENT ADULT - SLP GENERAL OBSERVATIONS
Pt received sleeping in bed, easily aroused, agitated, requiring frequent coaxing to encourage participation.

## 2019-02-13 NOTE — PHYSICAL THERAPY INITIAL EVALUATION ADULT - PERTINENT HX OF CURRENT PROBLEM, REHAB EVAL
Pt is a 73F w PMHx severe dementia (otherwise unknown PMHx) brought in by A after pt complained of R hip pain this AM. No known trauma history. No other history of injury able to be provided. Pt unable to answer questions.. Now s/p right Antegrade intramedullary nailing for fracture of femur

## 2019-02-13 NOTE — DISCHARGE NOTE ADULT - MEDICATION SUMMARY - MEDICATIONS TO TAKE
I will START or STAY ON the medications listed below when I get home from the hospital:    Tylenol 8 Hour Caplet 650 mg oral tablet, extended release  -- 1 tab(s) by mouth 3 times a day, As Needed  -- Indication: For Mild pain    traMADol 50 mg oral tablet  -- 0.5 tab(s) by mouth every 6 hours, As needed, Moderate Pain (4 - 6)  -- Indication: For Severe pain    aspirin 325 mg oral delayed release tablet  -- 1 tab(s) by mouth 2 times a day  -- Indication: For Anti coagulant    Keppra 500 mg oral tablet  -- 1 tab(s) by mouth 2 times a day  -- Indication: For Home med    Lipitor 10 mg oral tablet  -- 1 tab(s) by mouth once a day (at bedtime)  -- Indication: For Home med    haloperidol  -- 2.5 milligram(s) by mouth every 8 hours, As Needed for agitation  -- Indication: For Home med    QUEtiapine 25 mg oral tablet  -- 1 tab(s) by mouth once a day (at bedtime)  -- Indication: For Home med    atenolol 25 mg oral tablet  -- 1 tab(s) by mouth once a day  -- Indication: For Home med    Aricept 10 mg oral tablet  -- 1 tab(s) by mouth once a day (at bedtime)  -- Indication: For Home med    Colace 100 mg oral capsule  -- 1 cap(s) by mouth 3 times a day  -- Indication: For Home med    Namenda 10 mg oral tablet  -- 1 tab(s) by mouth once a day  -- Indication: For Home med    folic acid 1 mg oral tablet  -- 1 tab(s) by mouth once a day  -- Indication: For Home med

## 2019-02-13 NOTE — DISCHARGE NOTE ADULT - CARE PLAN
Principal Discharge DX:	Intertrochanteric fracture of right hip, closed, initial encounter  Goal:	Improvement in pain and ambulation after surgery  Assessment and plan of treatment:	No strenuous activity, heavy lifting, driving or returning to work until cleared by MD.  You may shower - dressing is water-resistant, no soaking in bathtubs.  Remove dressing after post op day 5-7, then leave incision open to air. Keep incision clean and dry.  Try to have regular bowel movements, take stool softener or laxative if necessary.  May take pepcid or zantac for upset stomach.  May take Aleve or naproxen instead of celebrex.  Swelling may travel all the way down leg to foot, this is normal and will subside in a few weeks.  Follow up with Dr. Saldana to schedule an appt, if you have staples or sutures they will be removed in office.  Contact your doctor if you experience: fever greater than 101.5, chills, chest pain, difficulty breathing, redness or excessive drainage around the incision, other concerns.

## 2019-02-13 NOTE — PHYSICAL THERAPY INITIAL EVALUATION ADULT - PLANNED THERAPY INTERVENTIONS, PT EVAL
bed mobility training/strengthening/transfer training/balance training/gait training/postural re-education

## 2019-02-13 NOTE — PROGRESS NOTE ADULT - SUBJECTIVE AND OBJECTIVE BOX
POST OPERATIVE DAY #1 right IM nail  SUBJECTIVE: Patient seen, HHA at bedside. Pt sleeping comfortably after reportedly having some agitation overnight.   Denies chest pain/SOB/dizziness/n/v/HA   Pain well controlled.       OBJECTIVE:     Vital Signs Last 24 Hrs  T(C): 37.3 (13 Feb 2019 08:51), Max: 37.8 (12 Feb 2019 22:52)  T(F): 99.1 (13 Feb 2019 08:51), Max: 100.1 (12 Feb 2019 22:52)  HR: 79 (13 Feb 2019 08:51) (66 - 99)  BP: 123/67 (13 Feb 2019 08:51) (96/51 - 143/67)  BP(mean): 88 (12 Feb 2019 22:15) (67 - 92)  RR: 18 (13 Feb 2019 08:51) (11 - 20)  SpO2: 94% (13 Feb 2019 08:51) (92% - 98%)    PE:          Dressing: clean/dry/intact, no active drainage         Skin warm, well-perfused; capillary refill brisk. DP palpable BLE.            LABS:                        11.0   10.43 )-----------( 150      ( 12 Feb 2019 06:57 )             34.6     142  |  105  |  17  ----------------------------<  100<H>  3.8   |  24  |  0.95    Ca    9.2      12 Feb 2019 06:57  TPro  7.6  /  Alb  4.0  /  TBili  0.7  /  DBili  x   /  AST  See Note  /  ALT  See Note  /  AlkPhos  75  02-11  PT/INR - ( 11 Feb 2019 14:43 )   PT: 13.2 sec;   INR: 1.16     PTT - ( 11 Feb 2019 14:43 )  PTT:29.6 sec  Urinalysis Basic - ( 12 Feb 2019 11:17 )    Color: Yellow / Appearance: Clear / SG: >=1.030 / pH: x  Gluc: x / Ketone: NEGATIVE  / Bili: Negative / Urobili: 0.2 E.U./dL   Blood: x / Protein: Trace mg/dL / Nitrite: POSITIVE   Leuk Esterase: Small / RBC: < 5 /HPF / WBC Many /HPF   Sq Epi: x / Non Sq Epi: 0-5 /HPF / Bacteria: Many /HPF    ASSESSMENT AND PLAN: 73F POD 1 right IM nail  1. Labs stable, medicine following, appreciate recs   2. Analgesic pain control  3. DVT prophylaxis: ASA BID   4. Weight Bearing Status:  WBAT   5. Disposition: Pending PT eval

## 2019-02-13 NOTE — SWALLOW BEDSIDE ASSESSMENT ADULT - NS SPL SWALLOW CLINIC TRIAL FT
Pt was initially very orally defensive, presenting with clenched teeth/tight labial seal. Tonic bite reflex was intermittently observed with spoon presentations. Improved bolus acceptance with straw presentations. Reduced bolus stripping noted. Attempted to verbalize with mech. soft bolus in oral cavity. Pt was initially very orally defensive, presenting with clenched teeth/tight labial seal. Tonic bite reflex was intermittently observed with spoon presentations. Improved bolus acceptance with straw presentations. Reduced bolus stripping noted. Reduced bolus formation with mechanical soft solids as pt continuously attempted to verbalize with bolus in oral cavity. No clinical overt s/s of aspiration were observed with liquids or solids. However, incomplete assessment as SLP was unable to consistently elicit phonation to assess vocal quality.

## 2019-02-13 NOTE — SWALLOW BEDSIDE ASSESSMENT ADULT - COMMENTS
HHA present at bedside but unable to provide case hx information as it was her first day with pt. HHA present at bedside but unable to provide case hx information as it was her first day attending to pt.

## 2019-02-13 NOTE — PROGRESS NOTE ADULT - SUBJECTIVE AND OBJECTIVE BOX
Interval Events: Reviewed  Patient seen and examined at bedside.    Patient is a 73y old  Female who presents with a chief complaint of R Intertrochanteric fracture (13 Feb 2019 13:36)  she is eating and not coughing    PAST MEDICAL & SURGICAL HISTORY:  Intracranial bleed  Dementia, with behavioral disturbance  Essential hypertension  History of back surgery: 20 years ago  S/P craniotomy: Right      MEDICATIONS:  Pulmonary:    Antimicrobials:  ceFAZolin  Injectable. 2000 milliGRAM(s) IV Push every 8 hours  ciprofloxacin     Tablet 250 milliGRAM(s) Oral two times a day    Anticoagulants:  aspirin enteric coated 325 milliGRAM(s) Oral two times a day    Cardiac:  ATENolol  Tablet 25 milliGRAM(s) Oral daily      Allergies    No Known Allergies    Intolerances        Vital Signs Last 24 Hrs  T(C): 36.2 (13 Feb 2019 21:00), Max: 37.8 (12 Feb 2019 22:52)  T(F): 97.1 (13 Feb 2019 21:00), Max: 100.1 (12 Feb 2019 22:52)  HR: 88 (13 Feb 2019 21:00) (79 - 99)  BP: 125/70 (13 Feb 2019 21:00) (98/53 - 162/75)  BP(mean): 88 (12 Feb 2019 22:15) (88 - 88)  RR: 17 (13 Feb 2019 21:00) (17 - 20)  SpO2: 95% (13 Feb 2019 21:00) (93% - 98%)    02-12 @ 07:01 - 02-13 @ 07:00  --------------------------------------------------------  IN: 225 mL / OUT: 150 mL / NET: 75 mL    02-13 @ 07:01 - 02-13 @ 21:57  --------------------------------------------------------  IN: 1165 mL / OUT: 450 mL / NET: 715 mL          LABS:      CBC Full  -  ( 12 Feb 2019 06:57 )  WBC Count : 10.43 K/uL  Hemoglobin : 11.0 g/dL  Hematocrit : 34.6 %  Platelet Count - Automated : 150 K/uL  Mean Cell Volume : 98.0 fl  Mean Cell Hemoglobin : 31.2 pg  Mean Cell Hemoglobin Concentration : 31.8 gm/dL  Auto Neutrophil # : 7.04 K/uL  Auto Lymphocyte # : 2.12 K/uL  Auto Monocyte # : 1.17 K/uL  Auto Eosinophil # : 0.03 K/uL  Auto Basophil # : 0.03 K/uL  Auto Neutrophil % : 67.5 %  Auto Lymphocyte % : 20.3 %  Auto Monocyte % : 11.2 %  Auto Eosinophil % : 0.3 %  Auto Basophil % : 0.3 %    02-12    142  |  105  |  17  ----------------------------<  100<H>  3.8   |  24  |  0.95    Ca    9.2      12 Feb 2019 06:57            Urinalysis Basic - ( 12 Feb 2019 11:17 )    Color: Yellow / Appearance: Clear / SG: >=1.030 / pH: x  Gluc: x / Ketone: NEGATIVE  / Bili: Negative / Urobili: 0.2 E.U./dL   Blood: x / Protein: Trace mg/dL / Nitrite: POSITIVE   Leuk Esterase: Small / RBC: < 5 /HPF / WBC Many /HPF   Sq Epi: x / Non Sq Epi: 0-5 /HPF / Bacteria: Many /HPF                  RADIOLOGY & ADDITIONAL STUDIES (The following images were personally reviewed):  Urban:                                     No  Urine output:                       adequate  DVT prophylaxis:                 Yes  Flattus:                                  Yes  Bowel movement:              No

## 2019-02-13 NOTE — DISCHARGE NOTE ADULT - HOSPITAL COURSE
Admit to Orthopaedics with right hip fracture; right intramedullary nail placed on _____   Perioperative Antibiotics  DVT prophylaxis  Physical Therapy  Pain Management

## 2019-02-13 NOTE — DISCHARGE NOTE ADULT - CARE PROVIDER_API CALL
Tashi Payton)  Orthopaedic Surgery Surgery  [address] [place] [address]  Phone: [phone]  Fax: [phone]  Follow Up Time:

## 2019-02-13 NOTE — SWALLOW BEDSIDE ASSESSMENT ADULT - SLP PERTINENT HISTORY OF CURRENT PROBLEM
Pt s/p antegrade intramedullary nailing for fracture of femur 2/12/19. PMHx significant for severe dementia with behavioral disturbances, intracranial bleed, s/p craniotomy R, essential HTN

## 2019-02-13 NOTE — SWALLOW BEDSIDE ASSESSMENT ADULT - SWALLOW EVAL: DIAGNOSIS
Pt presents with moderate-severe oral phase deficits which appear to be behavioral 2/2 diagnosis of dementia.

## 2019-02-14 LAB
ANION GAP SERPL CALC-SCNC: 14 MMOL/L — SIGNIFICANT CHANGE UP (ref 5–17)
BUN SERPL-MCNC: 12 MG/DL — SIGNIFICANT CHANGE UP (ref 7–23)
CALCIUM SERPL-MCNC: 8.5 MG/DL — SIGNIFICANT CHANGE UP (ref 8.4–10.5)
CHLORIDE SERPL-SCNC: 106 MMOL/L — SIGNIFICANT CHANGE UP (ref 96–108)
CO2 SERPL-SCNC: 19 MMOL/L — LOW (ref 22–31)
CREAT SERPL-MCNC: 0.73 MG/DL — SIGNIFICANT CHANGE UP (ref 0.5–1.3)
GLUCOSE SERPL-MCNC: 119 MG/DL — HIGH (ref 70–99)
HCT VFR BLD CALC: 27 % — LOW (ref 34.5–45)
HGB BLD-MCNC: 8.7 G/DL — LOW (ref 11.5–15.5)
MCHC RBC-ENTMCNC: 31.2 PG — SIGNIFICANT CHANGE UP (ref 27–34)
MCHC RBC-ENTMCNC: 32.2 GM/DL — SIGNIFICANT CHANGE UP (ref 32–36)
MCV RBC AUTO: 96.8 FL — SIGNIFICANT CHANGE UP (ref 80–100)
NRBC # BLD: 0 /100 WBCS — SIGNIFICANT CHANGE UP (ref 0–0)
PLATELET # BLD AUTO: 120 K/UL — LOW (ref 150–400)
POTASSIUM SERPL-MCNC: 3.3 MMOL/L — LOW (ref 3.5–5.3)
POTASSIUM SERPL-SCNC: 3.3 MMOL/L — LOW (ref 3.5–5.3)
RBC # BLD: 2.79 M/UL — LOW (ref 3.8–5.2)
RBC # FLD: 12.3 % — SIGNIFICANT CHANGE UP (ref 10.3–14.5)
SODIUM SERPL-SCNC: 139 MMOL/L — SIGNIFICANT CHANGE UP (ref 135–145)
WBC # BLD: 11.51 K/UL — HIGH (ref 3.8–10.5)
WBC # FLD AUTO: 11.51 K/UL — HIGH (ref 3.8–10.5)

## 2019-02-14 PROCEDURE — 99232 SBSQ HOSP IP/OBS MODERATE 35: CPT | Mod: GC

## 2019-02-14 RX ORDER — SODIUM CHLORIDE 9 MG/ML
500 INJECTION INTRAMUSCULAR; INTRAVENOUS; SUBCUTANEOUS ONCE
Qty: 0 | Refills: 0 | Status: COMPLETED | OUTPATIENT
Start: 2019-02-14 | End: 2019-02-14

## 2019-02-14 RX ORDER — POTASSIUM CHLORIDE 20 MEQ
10 PACKET (EA) ORAL
Qty: 0 | Refills: 0 | Status: COMPLETED | OUTPATIENT
Start: 2019-02-14 | End: 2019-02-14

## 2019-02-14 RX ADMIN — Medication 650 MILLIGRAM(S): at 06:37

## 2019-02-14 RX ADMIN — ATENOLOL 25 MILLIGRAM(S): 25 TABLET ORAL at 05:37

## 2019-02-14 RX ADMIN — Medication 325 MILLIGRAM(S): at 05:37

## 2019-02-14 RX ADMIN — Medication 100 MILLIEQUIVALENT(S): at 11:24

## 2019-02-14 RX ADMIN — Medication 250 MILLIGRAM(S): at 18:11

## 2019-02-14 RX ADMIN — LEVETIRACETAM 500 MILLIGRAM(S): 250 TABLET, FILM COATED ORAL at 18:11

## 2019-02-14 RX ADMIN — Medication 100 MILLIEQUIVALENT(S): at 13:38

## 2019-02-14 RX ADMIN — ATORVASTATIN CALCIUM 10 MILLIGRAM(S): 80 TABLET, FILM COATED ORAL at 22:29

## 2019-02-14 RX ADMIN — Medication 250 MILLIGRAM(S): at 05:36

## 2019-02-14 RX ADMIN — Medication 1 MILLIGRAM(S): at 18:11

## 2019-02-14 RX ADMIN — SODIUM CHLORIDE 500 MILLILITER(S): 9 INJECTION INTRAMUSCULAR; INTRAVENOUS; SUBCUTANEOUS at 10:56

## 2019-02-14 RX ADMIN — DONEPEZIL HYDROCHLORIDE 10 MILLIGRAM(S): 10 TABLET, FILM COATED ORAL at 22:29

## 2019-02-14 RX ADMIN — LEVETIRACETAM 500 MILLIGRAM(S): 250 TABLET, FILM COATED ORAL at 05:37

## 2019-02-14 RX ADMIN — POLYETHYLENE GLYCOL 3350 17 GRAM(S): 17 POWDER, FOR SOLUTION ORAL at 18:11

## 2019-02-14 RX ADMIN — Medication 650 MILLIGRAM(S): at 05:37

## 2019-02-14 RX ADMIN — Medication 325 MILLIGRAM(S): at 18:11

## 2019-02-14 RX ADMIN — QUETIAPINE FUMARATE 25 MILLIGRAM(S): 200 TABLET, FILM COATED ORAL at 22:29

## 2019-02-14 RX ADMIN — Medication 100 MILLIEQUIVALENT(S): at 10:16

## 2019-02-14 NOTE — PROGRESS NOTE ADULT - SUBJECTIVE AND OBJECTIVE BOX
Interval Events: Reviewed  Patient seen and examined at bedside.    Patient is a 73y old  Female who presents with a chief complaint of R Intertrochanteric fracture (14 Feb 2019 14:41)  she is doign OK     PAST MEDICAL & SURGICAL HISTORY:  Intracranial bleed  Dementia, with behavioral disturbance  Essential hypertension  History of back surgery: 20 years ago  S/P craniotomy: Right      MEDICATIONS:  Pulmonary:    Antimicrobials:  ciprofloxacin     Tablet 250 milliGRAM(s) Oral two times a day    Anticoagulants:  aspirin enteric coated 325 milliGRAM(s) Oral two times a day    Cardiac:  ATENolol  Tablet 25 milliGRAM(s) Oral daily      Allergies    No Known Allergies    Intolerances        Vital Signs Last 24 Hrs  T(C): 35.9 (14 Feb 2019 17:25), Max: 36.6 (14 Feb 2019 08:57)  T(F): 96.7 (14 Feb 2019 17:25), Max: 97.8 (14 Feb 2019 08:57)  HR: 82 (14 Feb 2019 17:25) (61 - 88)  BP: 120/68 (14 Feb 2019 17:25) (87/54 - 125/70)  BP(mean): --  RR: 16 (14 Feb 2019 17:25) (15 - 18)  SpO2: 97% (14 Feb 2019 17:25) (93% - 98%)    02-13 @ 07:01 - 02-14 @ 07:00  --------------------------------------------------------  IN: 2210 mL / OUT: 1250 mL / NET: 960 mL    02-14 @ 07:01 - 02-14 @ 18:59  --------------------------------------------------------  IN: 1455 mL / OUT: 0 mL / NET: 1455 mL          LABS:      CBC Full  -  ( 14 Feb 2019 07:01 )  WBC Count : 11.51 K/uL  Hemoglobin : 8.7 g/dL  Hematocrit : 27.0 %  Platelet Count - Automated : 120 K/uL  Mean Cell Volume : 96.8 fl  Mean Cell Hemoglobin : 31.2 pg  Mean Cell Hemoglobin Concentration : 32.2 gm/dL  Auto Neutrophil # : x  Auto Lymphocyte # : x  Auto Monocyte # : x  Auto Eosinophil # : x  Auto Basophil # : x  Auto Neutrophil % : x  Auto Lymphocyte % : x  Auto Monocyte % : x  Auto Eosinophil % : x  Auto Basophil % : x    02-14    139  |  106  |  12  ----------------------------<  119<H>  3.3<L>   |  19<L>  |  0.73    Ca    8.5      14 Feb 2019 07:01                          RADIOLOGY & ADDITIONAL STUDIES (The following images were personally reviewed):  Urban:                                     No  Urine output:                       adequate  DVT prophylaxis:                 Yes  Flattus:                                  Yes  Bowel movement:              No

## 2019-02-14 NOTE — PROGRESS NOTE ADULT - SUBJECTIVE AND OBJECTIVE BOX
POST OPERATIVE DAY #: 3  STATUS POST: Right IMN                    SUBJECTIVE: Patient seen and examined. Pt. has hx of dementia, states she feels fine, but unable to elicit anything further. Pt. aide at bedside states she ate all of her lunch, no other complaints.       OBJECTIVE:     Vital Signs Last 24 Hrs  T(C): 36.6 (14 Feb 2019 08:57), Max: 36.6 (14 Feb 2019 08:57)  T(F): 97.8 (14 Feb 2019 08:57), Max: 97.8 (14 Feb 2019 08:57)  HR: 69 (14 Feb 2019 13:45) (61 - 88)  BP: 113/57 (14 Feb 2019 13:45) (87/54 - 162/75)  BP(mean): --  RR: 15 (14 Feb 2019 13:45) (15 - 18)  SpO2: 95% (14 Feb 2019 13:45) (93% - 98%)    Affected extremity: right le          Dressing: clean/dry/intact          Sensation: intact to light touch to patient's baseline         Motor exam: Unable to have patient move feet, difficult to follow commands, Pulses 2+ b/l les             I&O's Detail    13 Feb 2019 07:01  -  14 Feb 2019 07:00  --------------------------------------------------------  IN:    Oral Fluid: 560 mL    sodium chloride 0.9%.: 1650 mL  Total IN: 2210 mL    OUT:    Voided: 1250 mL  Total OUT: 1250 mL    Total NET: 960 mL          LABS:                        8.7    11.51 )-----------( 120      ( 14 Feb 2019 07:01 )             27.0     02-14    139  |  106  |  12  ----------------------------<  119<H>  3.3<L>   |  19<L>  |  0.73    Ca    8.5      14 Feb 2019 07:01    MEDICATIONS:  ciprofloxacin     Tablet 250 milliGRAM(s) Oral two times a day    acetaminophen   Tablet .. 650 milliGRAM(s) Oral every 6 hours PRN  donepezil 10 milliGRAM(s) Oral at bedtime  haloperidol     Tablet 2 milliGRAM(s) Oral every 8 hours PRN  levETIRAcetam 500 milliGRAM(s) Oral two times a day  ondansetron Injectable 4 milliGRAM(s) IV Push every 6 hours PRN  QUEtiapine 25 milliGRAM(s) Oral at bedtime  traMADol 50 milliGRAM(s) Oral every 8 hours PRN  traMADol 25 milliGRAM(s) Oral every 6 hours PRN    aspirin enteric coated 325 milliGRAM(s) Oral two times a day        ASSESSMENT AND PLAN: 74yo Female s/p RIGHT IMN    1. Analgesic pain control  2. DVT prophylaxis: ASA       3. Weight Bearing Status:  Weight bearing as tolerated         4. Disposition:        Subacute Rehab        5. Hypokalemia- repleted with 3 runs of potassium  6. Hypotension- pt. received bolus of NS 500cc, pt. responded well POST OPERATIVE DAY #: 3  STATUS POST: Right IMN                    SUBJECTIVE: Patient seen and examined. Pt. has hx of dementia, states she feels fine, but unable to elicit anything further. Pt. aide at bedside states she ate all of her lunch, no other complaints.       OBJECTIVE:     Vital Signs Last 24 Hrs  T(C): 36.6 (14 Feb 2019 08:57), Max: 36.6 (14 Feb 2019 08:57)  T(F): 97.8 (14 Feb 2019 08:57), Max: 97.8 (14 Feb 2019 08:57)  HR: 69 (14 Feb 2019 13:45) (61 - 88)  BP: 113/57 (14 Feb 2019 13:45) (87/54 - 162/75)  BP(mean): --  RR: 15 (14 Feb 2019 13:45) (15 - 18)  SpO2: 95% (14 Feb 2019 13:45) (93% - 98%)    Affected extremity: right le          Dressing: clean/dry/intact          Sensation: intact to light touch to patient's baseline         Motor exam: Unable to have patient move feet, difficult to follow commands, Pulses 2+ b/l les             I&O's Detail    13 Feb 2019 07:01  -  14 Feb 2019 07:00  --------------------------------------------------------  IN:    Oral Fluid: 560 mL    sodium chloride 0.9%.: 1650 mL  Total IN: 2210 mL    OUT:    Voided: 1250 mL  Total OUT: 1250 mL    Total NET: 960 mL          LABS:                        8.7    11.51 )-----------( 120      ( 14 Feb 2019 07:01 )             27.0     02-14    139  |  106  |  12  ----------------------------<  119<H>  3.3<L>   |  19<L>  |  0.73    Ca    8.5      14 Feb 2019 07:01    MEDICATIONS:  ciprofloxacin     Tablet 250 milliGRAM(s) Oral two times a day    acetaminophen   Tablet .. 650 milliGRAM(s) Oral every 6 hours PRN  donepezil 10 milliGRAM(s) Oral at bedtime  haloperidol     Tablet 2 milliGRAM(s) Oral every 8 hours PRN  levETIRAcetam 500 milliGRAM(s) Oral two times a day  ondansetron Injectable 4 milliGRAM(s) IV Push every 6 hours PRN  QUEtiapine 25 milliGRAM(s) Oral at bedtime  traMADol 50 milliGRAM(s) Oral every 8 hours PRN  traMADol 25 milliGRAM(s) Oral every 6 hours PRN    aspirin enteric coated 325 milliGRAM(s) Oral two times a day        ASSESSMENT AND PLAN: 72yo Female s/p RIGHT IMN    1. Analgesic pain control  2. DVT prophylaxis: ASA       3. Weight Bearing Status:  Weight bearing as tolerated         4. Disposition:        Subacute Rehab        5. Hypokalemia- repleted with 3 runs of potassium  6. Hypotension- pt. received bolus of NS 500cc, pt. responded well  7. UTI- continue cipro 250 bid x 3 days

## 2019-02-14 NOTE — PROGRESS NOTE ADULT - SUBJECTIVE AND OBJECTIVE BOX
POST OPERATIVE DAY #2 right IM nail  SUBJECTIVE: Patient seen, HHA at bedside. Pt sleeping comfortably after reportedly having some agitation overnight.   Denies chest pain/SOB/dizziness/n/v/HA   Pain well controlled.       OBJECTIVE:     Vital Signs Last 24 Hrs  T(C): 36.1 (14 Feb 2019 05:25), Max: 37.3 (13 Feb 2019 08:51)  T(F): 96.9 (14 Feb 2019 05:25), Max: 99.1 (13 Feb 2019 08:51)  HR: 76 (14 Feb 2019 05:25) (76 - 88)  BP: 112/60 (14 Feb 2019 05:25) (112/60 - 162/75)  BP(mean): --  RR: 16 (14 Feb 2019 05:25) (16 - 18)  SpO2: 95% (14 Feb 2019 05:25) (93% - 95%)    PE:          Dressing: clean/dry/intact, no active drainage         Skin warm, well-perfused; capillary refill brisk. DP palpable BLE.            LABS:                        11.0   10.43 )-----------( 150      ( 12 Feb 2019 06:57 )             34.6     142  |  105  |  17  ----------------------------<  100<H>  3.8   |  24  |  0.95    Ca    9.2      12 Feb 2019 06:57  TPro  7.6  /  Alb  4.0  /  TBili  0.7  /  DBili  x   /  AST  See Note  /  ALT  See Note  /  AlkPhos  75  02-11  PT/INR - ( 11 Feb 2019 14:43 )   PT: 13.2 sec;   INR: 1.16     PTT - ( 11 Feb 2019 14:43 )  PTT:29.6 sec  Urinalysis Basic - ( 12 Feb 2019 11:17 )    Color: Yellow / Appearance: Clear / SG: >=1.030 / pH: x  Gluc: x / Ketone: NEGATIVE  / Bili: Negative / Urobili: 0.2 E.U./dL   Blood: x / Protein: Trace mg/dL / Nitrite: POSITIVE   Leuk Esterase: Small / RBC: < 5 /HPF / WBC Many /HPF   Sq Epi: x / Non Sq Epi: 0-5 /HPF / Bacteria: Many /HPF    ASSESSMENT AND PLAN: 73F POD 2 right IM nail  1. Labs stable, medicine following, appreciate recs   2. Analgesic pain control  3. DVT prophylaxis: ASA BID   4. Weight Bearing Status:  WBAT   5. Disposition: Pending PT eval

## 2019-02-15 LAB
ANION GAP SERPL CALC-SCNC: 8 MMOL/L — SIGNIFICANT CHANGE UP (ref 5–17)
BUN SERPL-MCNC: 13 MG/DL — SIGNIFICANT CHANGE UP (ref 7–23)
CALCIUM SERPL-MCNC: 8.2 MG/DL — LOW (ref 8.4–10.5)
CHLORIDE SERPL-SCNC: 111 MMOL/L — HIGH (ref 96–108)
CO2 SERPL-SCNC: 20 MMOL/L — LOW (ref 22–31)
CREAT SERPL-MCNC: 0.71 MG/DL — SIGNIFICANT CHANGE UP (ref 0.5–1.3)
GLUCOSE SERPL-MCNC: 106 MG/DL — HIGH (ref 70–99)
HCT VFR BLD CALC: 24.6 % — LOW (ref 34.5–45)
HGB BLD-MCNC: 8.1 G/DL — LOW (ref 11.5–15.5)
MCHC RBC-ENTMCNC: 31.3 PG — SIGNIFICANT CHANGE UP (ref 27–34)
MCHC RBC-ENTMCNC: 32.9 GM/DL — SIGNIFICANT CHANGE UP (ref 32–36)
MCV RBC AUTO: 95 FL — SIGNIFICANT CHANGE UP (ref 80–100)
NRBC # BLD: 0 /100 WBCS — SIGNIFICANT CHANGE UP (ref 0–0)
PLATELET # BLD AUTO: 131 K/UL — LOW (ref 150–400)
POTASSIUM SERPL-MCNC: 3.2 MMOL/L — LOW (ref 3.5–5.3)
POTASSIUM SERPL-SCNC: 3.2 MMOL/L — LOW (ref 3.5–5.3)
RBC # BLD: 2.59 M/UL — LOW (ref 3.8–5.2)
RBC # FLD: 12.4 % — SIGNIFICANT CHANGE UP (ref 10.3–14.5)
SODIUM SERPL-SCNC: 139 MMOL/L — SIGNIFICANT CHANGE UP (ref 135–145)
WBC # BLD: 9.58 K/UL — SIGNIFICANT CHANGE UP (ref 3.8–10.5)
WBC # FLD AUTO: 9.58 K/UL — SIGNIFICANT CHANGE UP (ref 3.8–10.5)

## 2019-02-15 PROCEDURE — 99232 SBSQ HOSP IP/OBS MODERATE 35: CPT | Mod: GC

## 2019-02-15 RX ORDER — POTASSIUM CHLORIDE 20 MEQ
40 PACKET (EA) ORAL ONCE
Qty: 0 | Refills: 0 | Status: COMPLETED | OUTPATIENT
Start: 2019-02-15 | End: 2019-02-15

## 2019-02-15 RX ADMIN — QUETIAPINE FUMARATE 25 MILLIGRAM(S): 200 TABLET, FILM COATED ORAL at 21:31

## 2019-02-15 RX ADMIN — ATENOLOL 25 MILLIGRAM(S): 25 TABLET ORAL at 06:18

## 2019-02-15 RX ADMIN — DONEPEZIL HYDROCHLORIDE 10 MILLIGRAM(S): 10 TABLET, FILM COATED ORAL at 21:31

## 2019-02-15 RX ADMIN — Medication 1 MILLIGRAM(S): at 11:01

## 2019-02-15 RX ADMIN — Medication 325 MILLIGRAM(S): at 06:18

## 2019-02-15 RX ADMIN — LEVETIRACETAM 500 MILLIGRAM(S): 250 TABLET, FILM COATED ORAL at 06:18

## 2019-02-15 RX ADMIN — LEVETIRACETAM 500 MILLIGRAM(S): 250 TABLET, FILM COATED ORAL at 17:09

## 2019-02-15 RX ADMIN — Medication 40 MILLIEQUIVALENT(S): at 11:01

## 2019-02-15 RX ADMIN — ATORVASTATIN CALCIUM 10 MILLIGRAM(S): 80 TABLET, FILM COATED ORAL at 21:31

## 2019-02-15 RX ADMIN — Medication 325 MILLIGRAM(S): at 17:08

## 2019-02-15 NOTE — PROGRESS NOTE ADULT - PROBLEM SELECTOR PLAN 6
The patient is hemodynamically stable.  The pain is controlled.  Patient is on DVT prophylaxis.  Patient is using incentive spirometry.  Oxygen saturation is acceptable.  Advance diet as tolerated.  Advance activity as tolerated.  Monitor for ileus.  Patient is on Laxatives.  Surgical wound is stable.  No indication for monitor bed.

## 2019-02-15 NOTE — PROGRESS NOTE ADULT - PROBLEM SELECTOR PROBLEM 4
Late onset Alzheimer's disease without behavioral disturbance

## 2019-02-15 NOTE — PROGRESS NOTE ADULT - SUBJECTIVE AND OBJECTIVE BOX
POST OPERATIVE DAY #: 4  STATUS POST: Right IMN                    SUBJECTIVE: Patient seen and examined. Pt. has hx of dementia, states she feels fine, but unable to elicit anything further. Pt. aide at bedside states she ate all of her lunch, no other complaints.       OBJECTIVE:     Vital Signs Last 24 Hrs  T(C): 36.4 (15 Feb 2019 04:53), Max: 36.6 (14 Feb 2019 08:57)  T(F): 97.5 (15 Feb 2019 04:53), Max: 97.8 (14 Feb 2019 08:57)  HR: 81 (15 Feb 2019 04:53) (61 - 85)  BP: 119/75 (15 Feb 2019 04:53) (87/54 - 128/77)  BP(mean): --  RR: 16 (15 Feb 2019 04:53) (15 - 18)  SpO2: 95% (15 Feb 2019 04:53) (93% - 98%)    Affected extremity: right le          Dressing: clean/dry/intact          Sensation: intact to light touch to patient's baseline         Motor exam: Unable to have patient move feet, difficult to follow commands, Pulses 2+ b/l les               MEDICATIONS:  ciprofloxacin     Tablet 250 milliGRAM(s) Oral two times a day    acetaminophen   Tablet .. 650 milliGRAM(s) Oral every 6 hours PRN  donepezil 10 milliGRAM(s) Oral at bedtime  haloperidol     Tablet 2 milliGRAM(s) Oral every 8 hours PRN  levETIRAcetam 500 milliGRAM(s) Oral two times a day  ondansetron Injectable 4 milliGRAM(s) IV Push every 6 hours PRN  QUEtiapine 25 milliGRAM(s) Oral at bedtime  traMADol 50 milliGRAM(s) Oral every 8 hours PRN  traMADol 25 milliGRAM(s) Oral every 6 hours PRN    aspirin enteric coated 325 milliGRAM(s) Oral two times a day        ASSESSMENT AND PLAN: 72yo Female s/p RIGHT IMN    1. Analgesic pain control  2. DVT prophylaxis: ASA       3. Weight Bearing Status:  Weight bearing as tolerated         4. Disposition:        Subacute Rehab        5. Hypokalemia- repleted with 3 runs of potassium  6. Hypotension- pt. received bolus of NS 500cc, pt. responded well  7. UTI- continue cipro 250 bid x 3 days

## 2019-02-15 NOTE — DIETITIAN INITIAL EVALUATION ADULT. - ENERGY NEEDS
Height: 5'4" Weight: 120lbs, IBW 120lbs+/-10%, %%, BMI 20.6  ABW used for calculations as pt between % of IBW.   Nutrient needs based on [facility] standards of care for maintenance in older adults.   Needs adjusted for age and post-op

## 2019-02-15 NOTE — PROGRESS NOTE ADULT - I HAVE PERSONALLY SEEN, EXAMINED, AND PARTICIPATED IN THE CARE OF THIS PATIENT.  I HAVE REVIEWED ALL PERTINENT CLINICAL INFORMATION, INCLUDING HISTORY, PHYSICAL EXAM, PLAN AND THE MEDICAL/PA/NP STUDENT’S NOTE AND AGREE EXCEPT AS NOTED.
The patient is a 40y Female complaining of arm pain/injury.
Statement Selected

## 2019-02-15 NOTE — PROGRESS NOTE ADULT - PROBLEM SELECTOR PROBLEM 1
Intertrochanteric fracture of right hip, closed, initial encounter

## 2019-02-15 NOTE — PROGRESS NOTE ADULT - PROBLEM SELECTOR PLAN 5
Hold on transfusion and monitor HB
Hold on transfusion and monitor HB. She might need one uint of PRBC transfused
Hold on transfusion and monitor HB

## 2019-02-15 NOTE — PROGRESS NOTE ADULT - ATTENDING COMMENTS
Patient seen and examined with house-staff during bedside rounds.  Resident note read, including vitals, physical findings, laboratory data, and radiological reports.   Revisions included below.  Direct personal management at bed side and extensive interpretation of the data.  Plan was outlined and discussed in details with the housestaff.  Decision making of high complexity  Action taken for acute disease activity to reflect the level of care provided:  - medication reconciliation  - review laboratory data  palliative care consult
Patient seen and examined with house-staff during bedside rounds.  Resident note read, including vitals, physical findings, laboratory data, and radiological reports.   Revisions included below.  Direct personal management at bed side and extensive interpretation of the data.  Plan was outlined and discussed in details with the housestaff.  Decision making of high complexity  Action taken for acute disease activity to reflect the level of care provided:  - medication reconciliation  - review laboratory data  palliative care consult  K was replaced
Patient seen and examined with house-staff during bedside rounds.  Resident note read, including vitals, physical findings, laboratory data, and radiological reports.   Revisions included below.  Direct personal management at bed side and extensive interpretation of the data.  Plan was outlined and discussed in details with the housestaff.  Decision making of high complexity  Action taken for acute disease activity to reflect the level of care provided:  - medication reconciliation  - review laboratory data  palliative care consult

## 2019-02-15 NOTE — PROGRESS NOTE ADULT - PROBLEM SELECTOR PLAN 7
on antibiotic and avoid joshi  changed to cipro
on antibiotic and avoid joshi  changed to cipro
on antibiotic and avoid joshi

## 2019-02-15 NOTE — DIETITIAN INITIAL EVALUATION ADULT. - OTHER INFO
72 yo F w/ h/o dementia s/p R IMN POD4. Pt seen in room, asleep w/ HHA at bedside. Currently on a pureed diet and tolerating well. Endorsing good appetite, consuming 50-75% of meals. Per aide, pt adheres to a pureed diet at home as well. No apparent GI distress, no N/V/D/C. Encouraged/discussed nutrient dense pureed options (cottage cheese, egg salad, tuna salad). NKFA or dietary restrictions. No known wt changes. Skin: surgical incision; GI WDL per flowsheet.

## 2019-02-15 NOTE — PROGRESS NOTE ADULT - SUBJECTIVE AND OBJECTIVE BOX
POST OPERATIVE DAY #: 3  STATUS POST:  Right IMN                    SUBJECTIVE: Patient seen and examined. Pt. sleeping but arousable, states she feels fine. Unable to elicit further conversation due to dementia afterwards. Pt. aide at bedside.     OBJECTIVE:     Vital Signs Last 24 Hrs  T(C): 35.7 (15 Feb 2019 08:40), Max: 36.4 (14 Feb 2019 21:01)  T(F): 96.2 (15 Feb 2019 08:40), Max: 97.5 (14 Feb 2019 21:01)  HR: 74 (15 Feb 2019 08:40) (74 - 85)  BP: 126/66 (15 Feb 2019 08:40) (119/75 - 128/77)  BP(mean): --  RR: 17 (15 Feb 2019 08:40) (16 - 17)  SpO2: 96% (15 Feb 2019 08:40) (95% - 97%)    Affected extremity: right le          Dressing: clean/dry/intact          Sensation: intact to light touch to patient's baseline         Motor exam: Unable to perform 2/2 to patient not able to follow commands and started to talk to herself, Pulses 2+             I&O's Detail    14 Feb 2019 07:01  -  15 Feb 2019 07:00  --------------------------------------------------------  IN:    Oral Fluid: 705 mL    Sodium Chloride 0.9% IV Bolus: 500 mL    sodium chloride 0.9%.: 375 mL  Total IN: 1580 mL    OUT:    Voided: 425 mL  Total OUT: 425 mL    Total NET: 1155 mL          LABS:                        8.1    9.58  )-----------( 131      ( 15 Feb 2019 07:27 )             24.6     02-15    139  |  111<H>  |  13  ----------------------------<  106<H>  3.2<L>   |  20<L>  |  0.71    Ca    8.2<L>      15 Feb 2019 07:27      MEDICATIONS:    acetaminophen   Tablet .. 650 milliGRAM(s) Oral every 6 hours PRN  donepezil 10 milliGRAM(s) Oral at bedtime  haloperidol     Tablet 2 milliGRAM(s) Oral every 8 hours PRN  levETIRAcetam 500 milliGRAM(s) Oral two times a day  ondansetron Injectable 4 milliGRAM(s) IV Push every 6 hours PRN  QUEtiapine 25 milliGRAM(s) Oral at bedtime  traMADol 50 milliGRAM(s) Oral every 8 hours PRN  traMADol 25 milliGRAM(s) Oral every 6 hours PRN    aspirin enteric coated 325 milliGRAM(s) Oral two times a day        ASSESSMENT AND PLAN: 72yo Female s/p right hip IMN     1. Analgesic pain control  2. DVT prophylaxis: ASA      3. Weight Bearing Status:  wbat b/l les   4. Disposition:        Subacute Rehab     5. H&H dropped will hold off on transfusion and follow up per Dr. Herrera, pt. stable otherwise, Will replete K

## 2019-02-15 NOTE — PROGRESS NOTE ADULT - SUBJECTIVE AND OBJECTIVE BOX
Interval Events: Reviewed  Patient seen and examined at bedside.    Patient is a 73y old  Female who presents with a chief complaint of R Intertrochanteric fracture (15 Feb 2019 14:32)    she is doing well and wants to get oob  PAST MEDICAL & SURGICAL HISTORY:  Intracranial bleed  Dementia, with behavioral disturbance  Essential hypertension  History of back surgery: 20 years ago  S/P craniotomy: Right      MEDICATIONS:  Pulmonary:    Antimicrobials:    Anticoagulants:  aspirin enteric coated 325 milliGRAM(s) Oral two times a day    Cardiac:  ATENolol  Tablet 25 milliGRAM(s) Oral daily      Allergies    No Known Allergies    Intolerances        Vital Signs Last 24 Hrs  T(C): 36.7 (15 Feb 2019 15:07), Max: 36.7 (15 Feb 2019 15:07)  T(F): 98.1 (15 Feb 2019 15:07), Max: 98.1 (15 Feb 2019 15:07)  HR: 82 (15 Feb 2019 15:07) (74 - 85)  BP: 131/74 (15 Feb 2019 15:07) (119/75 - 131/74)  BP(mean): --  RR: 16 (15 Feb 2019 15:07) (16 - 17)  SpO2: 96% (15 Feb 2019 15:07) (95% - 97%)    02-14 @ 07:01  -  02-15 @ 07:00  --------------------------------------------------------  IN: 1580 mL / OUT: 425 mL / NET: 1155 mL          LABS:      CBC Full  -  ( 15 Feb 2019 07:27 )  WBC Count : 9.58 K/uL  Hemoglobin : 8.1 g/dL  Hematocrit : 24.6 %  Platelet Count - Automated : 131 K/uL  Mean Cell Volume : 95.0 fl  Mean Cell Hemoglobin : 31.3 pg  Mean Cell Hemoglobin Concentration : 32.9 gm/dL  Auto Neutrophil # : x  Auto Lymphocyte # : x  Auto Monocyte # : x  Auto Eosinophil # : x  Auto Basophil # : x  Auto Neutrophil % : x  Auto Lymphocyte % : x  Auto Monocyte % : x  Auto Eosinophil % : x  Auto Basophil % : x    02-15    139  |  111<H>  |  13  ----------------------------<  106<H>  3.2<L>   |  20<L>  |  0.71    Ca    8.2<L>      15 Feb 2019 07:27                          RADIOLOGY & ADDITIONAL STUDIES (The following images were personally reviewed):  Urban:                                     No  Urine output:                       adequate  DVT prophylaxis:                 Yes  Flattus:                                  Yes  Bowel movement:              No

## 2019-02-16 VITALS
SYSTOLIC BLOOD PRESSURE: 131 MMHG | HEART RATE: 67 BPM | OXYGEN SATURATION: 95 % | DIASTOLIC BLOOD PRESSURE: 65 MMHG | TEMPERATURE: 96 F | RESPIRATION RATE: 16 BRPM

## 2019-02-16 LAB
ANION GAP SERPL CALC-SCNC: 11 MMOL/L — SIGNIFICANT CHANGE UP (ref 5–17)
BUN SERPL-MCNC: 10 MG/DL — SIGNIFICANT CHANGE UP (ref 7–23)
CALCIUM SERPL-MCNC: 8.6 MG/DL — SIGNIFICANT CHANGE UP (ref 8.4–10.5)
CHLORIDE SERPL-SCNC: 106 MMOL/L — SIGNIFICANT CHANGE UP (ref 96–108)
CO2 SERPL-SCNC: 21 MMOL/L — LOW (ref 22–31)
CREAT SERPL-MCNC: 0.69 MG/DL — SIGNIFICANT CHANGE UP (ref 0.5–1.3)
GLUCOSE SERPL-MCNC: 101 MG/DL — HIGH (ref 70–99)
HCT VFR BLD CALC: 26.5 % — LOW (ref 34.5–45)
HGB BLD-MCNC: 8.7 G/DL — LOW (ref 11.5–15.5)
MCHC RBC-ENTMCNC: 31.2 PG — SIGNIFICANT CHANGE UP (ref 27–34)
MCHC RBC-ENTMCNC: 32.8 GM/DL — SIGNIFICANT CHANGE UP (ref 32–36)
MCV RBC AUTO: 95 FL — SIGNIFICANT CHANGE UP (ref 80–100)
NRBC # BLD: 0 /100 WBCS — SIGNIFICANT CHANGE UP (ref 0–0)
PLATELET # BLD AUTO: 154 K/UL — SIGNIFICANT CHANGE UP (ref 150–400)
POTASSIUM SERPL-MCNC: 3.6 MMOL/L — SIGNIFICANT CHANGE UP (ref 3.5–5.3)
POTASSIUM SERPL-SCNC: 3.6 MMOL/L — SIGNIFICANT CHANGE UP (ref 3.5–5.3)
RBC # BLD: 2.79 M/UL — LOW (ref 3.8–5.2)
RBC # FLD: 12.2 % — SIGNIFICANT CHANGE UP (ref 10.3–14.5)
SODIUM SERPL-SCNC: 138 MMOL/L — SIGNIFICANT CHANGE UP (ref 135–145)
WBC # BLD: 8.82 K/UL — SIGNIFICANT CHANGE UP (ref 3.8–10.5)
WBC # FLD AUTO: 8.82 K/UL — SIGNIFICANT CHANGE UP (ref 3.8–10.5)

## 2019-02-16 PROCEDURE — 86850 RBC ANTIBODY SCREEN: CPT

## 2019-02-16 PROCEDURE — 92610 EVALUATE SWALLOWING FUNCTION: CPT

## 2019-02-16 PROCEDURE — 73552 X-RAY EXAM OF FEMUR 2/>: CPT

## 2019-02-16 PROCEDURE — 85610 PROTHROMBIN TIME: CPT

## 2019-02-16 PROCEDURE — 80048 BASIC METABOLIC PNL TOTAL CA: CPT

## 2019-02-16 PROCEDURE — 76000 FLUOROSCOPY <1 HR PHYS/QHP: CPT

## 2019-02-16 PROCEDURE — 73502 X-RAY EXAM HIP UNI 2-3 VIEWS: CPT

## 2019-02-16 PROCEDURE — 81001 URINALYSIS AUTO W/SCOPE: CPT

## 2019-02-16 PROCEDURE — 85025 COMPLETE CBC W/AUTO DIFF WBC: CPT

## 2019-02-16 PROCEDURE — 87641 MR-STAPH DNA AMP PROBE: CPT

## 2019-02-16 PROCEDURE — 97161 PT EVAL LOW COMPLEX 20 MIN: CPT

## 2019-02-16 PROCEDURE — 99285 EMERGENCY DEPT VISIT HI MDM: CPT | Mod: 25

## 2019-02-16 PROCEDURE — 92526 ORAL FUNCTION THERAPY: CPT

## 2019-02-16 PROCEDURE — 97116 GAIT TRAINING THERAPY: CPT

## 2019-02-16 PROCEDURE — 97530 THERAPEUTIC ACTIVITIES: CPT

## 2019-02-16 PROCEDURE — 36415 COLL VENOUS BLD VENIPUNCTURE: CPT

## 2019-02-16 PROCEDURE — 71045 X-RAY EXAM CHEST 1 VIEW: CPT

## 2019-02-16 PROCEDURE — 96374 THER/PROPH/DIAG INJ IV PUSH: CPT

## 2019-02-16 PROCEDURE — 85730 THROMBOPLASTIN TIME PARTIAL: CPT

## 2019-02-16 PROCEDURE — 80053 COMPREHEN METABOLIC PANEL: CPT

## 2019-02-16 PROCEDURE — 85027 COMPLETE CBC AUTOMATED: CPT

## 2019-02-16 PROCEDURE — 86900 BLOOD TYPING SEROLOGIC ABO: CPT

## 2019-02-16 PROCEDURE — 86901 BLOOD TYPING SEROLOGIC RH(D): CPT

## 2019-02-16 PROCEDURE — C1713: CPT

## 2019-02-16 RX ORDER — TRAMADOL HYDROCHLORIDE 50 MG/1
0.5 TABLET ORAL
Qty: 0 | Refills: 0 | COMMUNITY
Start: 2019-02-16

## 2019-02-16 RX ORDER — ASPIRIN/CALCIUM CARB/MAGNESIUM 324 MG
1 TABLET ORAL
Qty: 0 | Refills: 0 | COMMUNITY
Start: 2019-02-16

## 2019-02-16 RX ADMIN — LEVETIRACETAM 500 MILLIGRAM(S): 250 TABLET, FILM COATED ORAL at 06:29

## 2019-02-16 RX ADMIN — Medication 325 MILLIGRAM(S): at 06:29

## 2019-02-16 RX ADMIN — ATENOLOL 25 MILLIGRAM(S): 25 TABLET ORAL at 06:30

## 2019-02-16 NOTE — PROGRESS NOTE ADULT - SUBJECTIVE AND OBJECTIVE BOX
Ortho Progress Note    SUBJECTIVE: Patient seen and examined. Pt. sleeping but arousable, states she feels fine. Unable to elicit further conversation due to dementia afterwards.     OBJECTIVE:     Vital Signs Last 24 Hrs  T(C): 35.8 (16 Feb 2019 08:30), Max: 37.6 (15 Feb 2019 20:46)  T(F): 96.5 (16 Feb 2019 08:30), Max: 99.6 (15 Feb 2019 20:46)  HR: 67 (16 Feb 2019 08:30) (67 - 90)  BP: 131/65 (16 Feb 2019 08:30) (129/73 - 151/88)  BP(mean): --  RR: 16 (16 Feb 2019 08:30) (16 - 17)  SpO2: 95% (16 Feb 2019 08:30) (95% - 96%)    Affected extremity: right le          Dressing: clean/dry/intact          Sensation: intact to light touch to patient's baseline         Motor exam: Unable to perform 2/2 to patient not able to follow commands and started to talk to herself, Pulses 2+             I&O's Detail    14 Feb 2019 07:01  -  15 Feb 2019 07:00  --------------------------------------------------------  IN:    Oral Fluid: 705 mL    Sodium Chloride 0.9% IV Bolus: 500 mL    sodium chloride 0.9%.: 375 mL  Total IN: 1580 mL    OUT:    Voided: 425 mL  Total OUT: 425 mL    Total NET: 1155 mL          LABS:                        8.1    9.58  )-----------( 131      ( 15 Feb 2019 07:27 )             24.6     02-15    139  |  111<H>  |  13  ----------------------------<  106<H>  3.2<L>   |  20<L>  |  0.71    Ca    8.2<L>      15 Feb 2019 07:27      MEDICATIONS:    acetaminophen   Tablet .. 650 milliGRAM(s) Oral every 6 hours PRN  donepezil 10 milliGRAM(s) Oral at bedtime  haloperidol     Tablet 2 milliGRAM(s) Oral every 8 hours PRN  levETIRAcetam 500 milliGRAM(s) Oral two times a day  ondansetron Injectable 4 milliGRAM(s) IV Push every 6 hours PRN  QUEtiapine 25 milliGRAM(s) Oral at bedtime  traMADol 50 milliGRAM(s) Oral every 8 hours PRN  traMADol 25 milliGRAM(s) Oral every 6 hours PRN    aspirin enteric coated 325 milliGRAM(s) Oral two times a day        ASSESSMENT AND PLAN: 72yo Female s/p right hip IMN     1. Analgesic pain control  2. DVT prophylaxis: ASA      3. Weight Bearing Status:  wbat b/l les   4. Disposition:        Subacute Rehab     5. f/u AM labs

## 2019-02-16 NOTE — PROGRESS NOTE ADULT - PROVIDER SPECIALTY LIST ADULT
Internal Medicine
Orthopedics

## 2019-02-16 NOTE — PROGRESS NOTE ADULT - REASON FOR ADMISSION
R Intertrochanteric fracture

## 2019-02-20 DIAGNOSIS — S72.141A DISPLACED INTERTROCHANTERIC FRACTURE OF RIGHT FEMUR, INITIAL ENCOUNTER FOR CLOSED FRACTURE: ICD-10-CM

## 2019-02-20 DIAGNOSIS — M25.559 PAIN IN UNSPECIFIED HIP: ICD-10-CM

## 2019-02-20 DIAGNOSIS — G30.1 ALZHEIMER'S DISEASE WITH LATE ONSET: ICD-10-CM

## 2019-02-20 DIAGNOSIS — F02.80 DEMENTIA IN OTHER DISEASES CLASSIFIED ELSEWHERE, UNSPECIFIED SEVERITY, WITHOUT BEHAVIORAL DISTURBANCE, PSYCHOTIC DISTURBANCE, MOOD DISTURBANCE, AND ANXIETY: ICD-10-CM

## 2019-02-20 DIAGNOSIS — I10 ESSENTIAL (PRIMARY) HYPERTENSION: ICD-10-CM

## 2019-02-20 DIAGNOSIS — Z98.890 OTHER SPECIFIED POSTPROCEDURAL STATES: ICD-10-CM
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
## 2019-02-27 DIAGNOSIS — I95.9 HYPOTENSION, UNSPECIFIED: ICD-10-CM

## 2019-02-27 DIAGNOSIS — N39.0 URINARY TRACT INFECTION, SITE NOT SPECIFIED: ICD-10-CM

## 2019-03-08 ENCOUNTER — INBOUND DOCUMENT (OUTPATIENT)
Age: 74
End: 2019-03-08

## 2019-03-08 PROBLEM — Z00.00 ENCOUNTER FOR PREVENTIVE HEALTH EXAMINATION: Status: ACTIVE | Noted: 2019-03-08

## 2021-07-31 NOTE — PATIENT PROFILE ADULT - NSPROGENANESREACTION_GEN_A_NUR
Patient : Kourtney Carmichael Age: 86 year old Sex: female   MRN: [id number] Encounter Date: 7/31/2021      History     Chief Complaint   Patient presents with   • Weakness   • Musculoskeletal Problem     HPI   86-year-old female with history of atrial fibrillation (Eliquis, Coreg), presenting this morning with confusion and altered mental status from her nursing home.  EMS found blood pressure 68/48.    Cheli complains of “I do not feel well,\" but is unable to specify any more of her symptoms.    For EMS, she began having increase in weakness yesterday, typically she is able to walk.    Allergies   Allergen Reactions   • Floxin [Ocuflox] GI UPSET   • Lisinopril PRURITUS       Current Discharge Medication List      Prior to Admission Medications    Details   furosemide (LASIX) 20 MG tablet Take 2 tablets by mouth daily.  Qty: 30 tablet, Refills: 1      Nystop 761284 UNIT/GM powder Apply 1 application topically 2 times daily. To right groin  Qty: 30 g, Refills: 1      apixaBAN (Eliquis) 2.5 MG Tab Take 1 tablet by mouth every 12 hours.  Qty: 180 tablet, Refills: 3      cholecalciferol (Vitamin D, Cholecalciferol,) 25 mcg (1,000 units) tablet Take 25 mcg by mouth 3 days a week. MWF      meclizine (ANTIVERT) 25 MG tablet Take 1 tablet by mouth 3 times daily.  Qty: 30 tablet, Refills: 0      aspirin (Aspirin 81) 81 MG EC tablet Take 1 tablet by mouth daily.  Qty:        Misc. Devices (Wheelchair) [facility] [place]  Patient's Weight: 113.4 kg  Patient's Height: 5'5\"  Qty: 1 each, Refills: 0      timolol (ISTALOL) 0.5 % (DAILY) ophthalmic solution PLACE 1 DROP IN RIGHT EYE EVERY NIGHT AT BEDTIME      omeprazole (PriLOSEC) 20 MG capsule Take 1 capsule by mouth daily.  Qty: 90 capsule, Refills: 3      levothyroxine 88 MCG tablet TAKE 1 TABLET BY MOUTH DAILY  Qty: 90 tablet, Refills: 3      carvedilol (COREG) 6.25 MG tablet TAKE 1 TABLET BY MOUTH TWICE DAILY  Qty: 180 tablet, Refills: 3      potassium CHLORIDE  (KLOR-CON M) 20 MEQ fab ER tablet TAKE 1 TABLET BY MOUTH DAILY WITH BREAKFAST. DO NOT. START BEFORE MARCH 24, 2020  Qty: 90 tablet, Refills: 0      albuterol (PROAIR HFA) 108 (90 Base) MCG/ACT inhaler Inhale 2 puffs into the lungs every 6 hours as needed for Shortness of Breath or Wheezing.      acetaminophen (TYLENOL) 500 MG tablet Take 500 mg by mouth every 4 hours as needed for Pain or Fever.       Multiple Vitamins-Minerals (CENTRUM SILVER) tablet Take 1 tablet by mouth as directed. Take 3-4 times a week             Past Medical History:   Diagnosis Date   • Arthritis    • Asthma    • Atrial fibrillation (CMS/HCC)    • Congestive cardiac failure (CMS/HCC)    • Corneal defect, bilateral    • DJD (degenerative joint disease) of cervical spine    • DJD (degenerative joint disease) of knee    • GERD (gastroesophageal reflux disease)    • Glaucoma     Right Eye   • Glaucoma    • HTN (hypertension)    • Hyperlipidemia    • Hypothyroidism    • IBS (irritable bowel syndrome)    • Incontinence of urine     Wears depends   • Mitral valve prolapse    • OAB (overactive bladder)    • Obesity    • PONV (postoperative nausea and vomiting)    • Thyroid nodule        Past Surgical History:   Procedure Laterality Date   • COLONOSCOPY  05/06/2002    Small non adenomatous polyp removed from the splenic flexure   • COLONOSCOPY  01/20/2006    Arteriovenous malformation of the cecum cauterized due to rectal bleeding   • DEXA BONE DENSITY AXIAL SKELETON  8/12/2010   • ECHO HEART RESTING  07/07/2011    EF 50%. Mild PH. Mild-mod MR, AR, TR.   • ECHO HEART RESTING  07/12/2012    EF 61%. Mildly incr biatrial size. Mild-mod MR, AR. Mild TR. Mildly dilated asc aorta.   • EP ABLATION  11/02/2007    Successful RF ablation of a-flutter w/establishment of bidirectional cavotricuspid isthmus block. Post-ablation, no SV or SVT induced despite aggressive stimulation. Inducible nonsustained VT prior to ablation.   • EP STUDY  11/02/2007    Baseline  normal conduction interval. Easily inducible sustained typical counterclockwise a-flutter, cycle length 222msec.   • ESOPHAGOGASTRODUODENOSCOPY  06/17/2013    Gastritis (Dr. Lion)   • EYE SURGERY Right 1976    Corneal Transplant - probably needs redo in future   • GI ENDOSCOPIC ULTRASOUND  06/17/2013    Normal-appearing endoscopic ultrasound without visualization of dwight-gastric (anterior wall near antrum) lesion (Dr. Lion)   • HEMORRHOID SURGERY  1959   • HYSTERECTOMY  1976    Partial   • JOINT REPLACEMENT Left 06/06/2007    Total Knee Replacement (Dr. Gurrola)   • LEFT HEART CATH INCLUDING LEFT VENTICULOGRAPHY W MD SUP AND INTERP  11/01/2007    Normal coronaries. Normal BLE arterial systems. EF 40-45%.   • MYOCARDL PERF REST STRESS  09/18/2009    Normal Adenosine   • US LOWER EXTREMITY ARTERIES DUPLEX BILATERAL  11/03/2005    Normal. ABIs indicated mild decr inflow disease.       Family History   Problem Relation Age of Onset   • Asthma Mother    • Cancer Father         Throat       Social History     Tobacco Use   • Smoking status: Never Smoker   • Smokeless tobacco: Never Used   Substance Use Topics   • Alcohol use: Yes     Alcohol/week: 0.0 - 1.0 standard drinks     Comment: Rare   • Drug use: No       E-cigarette/Vaping     E-Cigarette/Vaping Substances & Devices       Review of Systems   Unable to perform ROS: Mental status change       Physical Exam     ED Triage Vitals [07/31/21 0839]   ED Triage Vitals Group      Temp       Heart Rate (!) 58      Resp 14      BP (!) 88/51      SpO2 98 %      EtCO2 mmHg       Height       Weight       Weight Scale Used       BMI (Calculated)       IBW/kg (Calculated)        Physical Exam   There is no height or weight on file to calculate BMI.  Above vital signs reviewed   Constitutional:  Somnolent, pale, otherwise no acute distress.  HEENT: Head normocephalic and atraumatic. EOMI. No scleral icterus or erythema.   CV: Regular radial pulse, regular rate by  telemetry  Chest:  Ecchymosis to left lateral breast  Respiratory: No respiratory distress, breathing comfortably on room air.  Abdomen:  Obese.  Not distended.  Skin: Warm; ecchymosis to left lateral breast, excoriations beneath bilateral breasts. Excoriations dwight-rectal.   Extremities: Non-tender. 2+ lower extremity edema.  Neuro:  Somnolent, answering questions with 1 word answers, moves all extremities but diffusely weakened    Black stool, guaiac positive, not gross blood.      ED Course     Procedures    Lab Results     Results for orders placed or performed during the hospital encounter of 07/31/21   Comprehensive Metabolic Panel   Result Value Ref Range    Fasting Status      Sodium 142 135 - 145 mmol/L    Potassium 4.0 3.4 - 5.1 mmol/L    Chloride 104 98 - 107 mmol/L    Carbon Dioxide 32 21 - 32 mmol/L    Anion Gap 10 10 - 20 mmol/L    Glucose 98 65 - 99 mg/dL    BUN 39 (H) 6 - 20 mg/dL    Creatinine 1.68 (H) 0.51 - 0.95 mg/dL    Glomerular Filtration Rate 27 (L) >90 mL/min/1.73m2    BUN/ Creatinine Ratio 23 7 - 25    Calcium 8.7 8.4 - 10.2 mg/dL    Bilirubin, Total 0.7 0.2 - 1.0 mg/dL    GOT/AST 55 (H) <=37 Units/L    GPT/ALT 46 <64 Units/L    Alkaline Phosphatase 171 (H) 45 - 117 Units/L    Albumin 2.0 (L) 3.6 - 5.1 g/dL    Protein, Total 5.7 (L) 6.4 - 8.2 g/dL    Globulin 3.7 2.0 - 4.0 g/dL    A/G Ratio 0.5 (L) 1.0 - 2.4   Prothrombin Time   Result Value Ref Range    Prothrombin Time 12.4 (H) 9.7 - 11.8 sec    INR 1.2     Partial Thromboplastin Time   Result Value Ref Range    PTT 28 22 - 30 sec   Urinalysis & Reflex Microscopy With Culture If Indicated   Result Value Ref Range    COLOR, URINALYSIS Yellow     APPEARANCE, URINALYSIS Clear     GLUCOSE, URINALYSIS Negative Negative mg/dL    BILIRUBIN, URINALYSIS Negative Negative    KETONES, URINALYSIS Trace (A) Negative mg/dL    SPECIFIC GRAVITY, URINALYSIS 1.025 1.005 - 1.030    OCCULT BLOOD, URINALYSIS Negative Negative    PH, URINALYSIS 5.0 5.0 - 7.0     PROTEIN, URINALYSIS Negative Negative mg/dL    UROBILINOGEN, URINALYSIS 1.0 0.2, 1.0 mg/dL    NITRITE, URINALYSIS Negative Negative    LEUKOCYTE ESTERASE, URINALYSIS Negative Negative   CBC with Automated Differential (performable only)   Result Value Ref Range    WBC 6.2 4.2 - 11.0 K/mcL    RBC 2.95 (L) 4.00 - 5.20 mil/mcL    HGB 8.7 (L) 12.0 - 15.5 g/dL    HCT 27.2 (L) 36.0 - 46.5 %    MCV 92.2 78.0 - 100.0 fl    MCH 29.5 26.0 - 34.0 pg    MCHC 32.0 32.0 - 36.5 g/dL    RDW-CV 20.7 (H) 11.0 - 15.0 %    RDW-SD 68.9 (H) 39.0 - 50.0 fL     140 - 450 K/mcL    NRBC 0 <=0 /100 WBC    Neutrophil, Percent 60 %    Lymphocytes, Percent 24 %    Mono, Percent 13 %    Eosinophils, Percent 2 %    Basophils, Percent 0 %    Immature Granulocytes 1 %    Absolute Neutrophils 3.8 1.8 - 7.7 K/mcL    Absolute Lymphocytes 1.5 1.0 - 4.0 K/mcL    Absolute Monocytes 0.8 0.3 - 0.9 K/mcL    Absolute Eosinophils  0.1 0.0 - 0.5 K/mcL    Absolute Basophils 0.0 0.0 - 0.3 K/mcL    Absolute Immmature Granulocytes 0.0 0.0 - 0.2 K/mcL   Thyroid Stimulating Hormone Reflex   Result Value Ref Range    TSH 9.216 (H) 0.350 - 5.000 mcUnits/mL   Free T4   Result Value Ref Range    T4, Free 1.4 0.8 - 1.5 ng/dL   TYPE/SCREEN   Result Value Ref Range    ABO/RH(D) O Rh Negative     ANTIBODY SCREEN Negative     TYPE AND SCREEN EXPIRATION DATE 08/03/2021 23:59    LACTIC ACID VENOUS POINT OF CARE   Result Value Ref Range    LACTIC ACID, VENOUS - POINT OF CARE 1.4 <2.0 mmol/L       EKG Results       EKG Interpretation  EKG time:  8:46 a.m.  EKG interpretation time: 8:47 AM  Rate:  49  Rhythm: atrial fibrillation   STEMI: no  Abnormality:  Right bundle branch block, slow ventricular response    EKG interpreted by ED physician      Radiology Results     Imaging Results          XR Chest AP or PA (Final result)  Result time 07/31/21 10:26:52    Final result                 Impression:    FINDINGS/IMPRESSION:    Chronic cardiomegaly, similar to previous. No  definitive pulmonary vascular  congestion.    Chronic mild left basilar probable atelectasis and small left pleural  effusion, similar to previous. New mild patchy right basilar atelectasis or  possibly early airspace disease.     No right pleural effusion. No pneumothorax.               Narrative:    XR CHEST AP OR PA    HISTORY: Fever     COMPARISON: Multiple prior chest radiographs, most recent 5/22/2021.                                ED Medication Orders (From admission, onward)    Ordered Start     Status Ordering Provider    07/31/21 0905 07/31/21 0906  sodium chloride (NORMAL SALINE) 0.9 % bolus 1,000 mL  ONCE      Last MAR action: Completed BRETT BAGLEY               MDM   86-year-old female with decreased mental status, low blood pressure.  She has had decreased mental status and weakness since yesterday per EMS report.    I have suspicion that her hypotension is contributing to her weakness.  Will assess lactate, CBC, chemistry, straight cath urine and obtain chest x-ray in seeking and etiology of her hypotension.  Thus far she has 0 SIRS criteria.     Per paperwork from [employer], she has a do not resuscitate order.  When asked she indicates she would not want CPR, would not want any breathing tubes.  Even if she lacks full decision capacity, she has paperwork from [employer] affirming previous discussions.    9:05 AM  Blood pressure 72/38.  We are starting volume resuscitation. Lactate 1.4.  White count is 6.2.  Again she has 0 SIRS criteria.  Hemoglobin 8.7.  With a guaiac-positive stools, I will give a dose of Protonix.    9:52 AM  In discussion with her son, they had discussed just yesterday with her physician about internal bleeding.  The options were to stop her blood thinners or to undergo additional workup including colonoscopy.  This is the route they have chosen, and will have an outpatient colonoscopy at some point in the future.  Her son also mentions that she has  been “in a daze,” for several weeks, which he attributes to her thyroid medications being changed around recently.    10:46 AM  Creatinine 1.68, up from 1.23 on 06/15.  UA is without signs of infection, trace ketones.    11:10 AM  I discussed with Dr. Horan, the hospitalist, who agrees that the only heart finding is the guaiac-positive stool, anemia, and acute kidney injury.  Due to the debility and weakness, admission is warranted.  He did request that I discuss with Dr. Negro, on-call for GI.  I discussed with Dr. Negro, who recommends stopping Eliquis and monitoring.  No reason to keep her NPO at this point, and he is not plan on interventions unless additional signs of acute GI bleeding.    As documented above, this patient is critically ill. I spent a total of 40 minutes caring for her today, exclusive of time spent performing procedures.       Clinical Impression     ED Diagnosis   1. Hypotension, unspecified hypotension type     2. Acute blood loss anemia     3. Acute kidney injury (CMS/HCC)     4. Upper GI bleed     5. Current use of long term anticoagulation         Disposition        Admit 7/31/2021 11:13 AM  Telemetry Bed?: Yes  Admitting Physician: LUIS MELLO [[id number]]  Is this a telephone or verbal order?: This is a telephone order from the admitting physician                     Abhijeet Keenan MD  07/31/21 5850     unable to obtain info

## 2025-03-13 NOTE — PROGRESS NOTE ADULT - PROBLEM/PLAN-4
Mom called back states she needs to have the script state is for  aquatherapy  is for facility [facility] now in [place]  also states needs a car seat and stroller to go to [facility] please call patient when ready    
Mom left a voicemail:  Good morning. My name is Floridalma Escobar. I'm calling on behalf of Aristides Mcneill. His YOB: 2023. I'm trying to book an appointment for a referral. You can call me back up for [phone]. Thank you. Have a good.  Called mom and she stated that child needs a medical referral for aquatherapy so he can have the evaluation with [facility]. Please send referral to [phone].  
Please let him know that I have sent in a statin medication for him.  We should recheck his lipid panel in 6 months.  Please ask him to let us know if he has any issues.  
Thank you!     Faxed.   
Unable to place order at this time. Chart being used elsewhere. Physical therapy referral, aquatherapy in comments.   
DISPLAY PLAN FREE TEXT